# Patient Record
Sex: MALE | Race: WHITE | Employment: FULL TIME | URBAN - METROPOLITAN AREA
[De-identification: names, ages, dates, MRNs, and addresses within clinical notes are randomized per-mention and may not be internally consistent; named-entity substitution may affect disease eponyms.]

---

## 2017-05-30 ENCOUNTER — HOSPITAL ENCOUNTER (EMERGENCY)
Age: 32
Discharge: HOME | End: 2017-05-30
Attending: EMERGENCY MEDICINE
Payer: MEDICAID

## 2017-05-30 VITALS
TEMPERATURE: 98.1 F | SYSTOLIC BLOOD PRESSURE: 155 MMHG | OXYGEN SATURATION: 95 % | BODY MASS INDEX: 26.66 KG/M2 | RESPIRATION RATE: 16 BRPM | HEIGHT: 65 IN | WEIGHT: 160 LBS | HEART RATE: 95 BPM | DIASTOLIC BLOOD PRESSURE: 82 MMHG

## 2017-05-30 DIAGNOSIS — S05.01XA CORNEAL ABRASION, RIGHT, INITIAL ENCOUNTER: Primary | ICD-10-CM

## 2017-05-30 PROCEDURE — 74011000250 HC RX REV CODE- 250: Performed by: PHYSICIAN ASSISTANT

## 2017-05-30 PROCEDURE — 99283 EMERGENCY DEPT VISIT LOW MDM: CPT

## 2017-05-30 RX ORDER — TETRACAINE HYDROCHLORIDE 5 MG/ML
1 SOLUTION OPHTHALMIC
Status: COMPLETED | OUTPATIENT
Start: 2017-05-30 | End: 2017-05-30

## 2017-05-30 RX ORDER — ERYTHROMYCIN 5 MG/G
OINTMENT OPHTHALMIC
Qty: 1 TUBE | Refills: 0 | Status: SHIPPED | OUTPATIENT
Start: 2017-05-30 | End: 2017-06-06

## 2017-05-30 RX ORDER — HYDROCODONE BITARTRATE AND ACETAMINOPHEN 5; 325 MG/1; MG/1
1 TABLET ORAL
Qty: 8 TAB | Refills: 0 | Status: SHIPPED | OUTPATIENT
Start: 2017-05-30 | End: 2018-06-12

## 2017-05-30 RX ADMIN — TETRACAINE HYDROCHLORIDE 1 DROP: 5 SOLUTION OPHTHALMIC at 11:55

## 2017-05-30 RX ADMIN — FLUORESCEIN SODIUM 1 STRIP: 1 STRIP OPHTHALMIC at 11:55

## 2017-05-30 NOTE — DISCHARGE INSTRUCTIONS
Corneal Scratches: Care Instructions  Your Care Instructions    The cornea is the clear surface that covers the front of the eye. When a speck of dirt, a wood chip, an insect, or another object flies into your eye, it can cause a painful scratch on the cornea. Wearing contact lenses too long or rubbing your eyes can also scratch the cornea. Small scratches usually heal in a day or two. Deeper scratches may take longer. If you have had a foreign object removed from your eye or you have a corneal scratch, you will need to watch for infection and vision problems while your eye heals. Follow-up care is a key part of your treatment and safety. Be sure to make and go to all appointments, and call your doctor if you are having problems. Its also a good idea to know your test results and keep a list of the medicines you take. How can you care for yourself at home? · The doctor probably used a medicine during your exam to numb your eye. When it wears off in 30 to 60 minutes, your eye pain may come back. Take pain medicines exactly as directed. ¨ If the doctor gave you a prescription medicine for pain, take it as prescribed. ¨ If you are not taking a prescription pain medicine, ask your doctor if you can take an over-the-counter medicine. ¨ Do not take two or more pain medicines at the same time unless the doctor told you to. Many pain medicines have acetaminophen, which is Tylenol. Too much acetaminophen (Tylenol) can be harmful. · Do not rub your injured eye. Rubbing can make it worse. · Use the prescribed eyedrops or ointment as directed. Be sure the dropper or bottle tip is clean. To put in eyedrops or ointment:  ¨ Tilt your head back, and pull your lower eyelid down with one finger. ¨ Drop or squirt the medicine inside the lower lid. ¨ Close your eye for 30 to 60 seconds to let the drops or ointment move around. ¨ Do not touch the ointment or dropper tip to your eyelashes or any other surface.   · Do not use your contact lens in your hurt eye until your doctor says you can. Also, do not wear eye makeup until your eye has healed. · Do not drive if you have blurred vision. · Bright light may hurt. Sunglasses can help. · To prevent eye injuries in the future, wear safety glasses or goggles when you work with machines or tools, mow the lawn, or ride a bike or motorcycle. When should you call for help? Call your doctor now or seek immediate medical care if:  · You have any changes in your vision, flashes of light, or floaters (shadows or dark objects that float across your field of vision). · Pain or redness gets worse, or there is yellow, green, or bloody pus coming from the eye. · You have a fever. Watch closely for changes in your health, and be sure to contact your doctor if you have any problems. Where can you learn more? Go to http://brian-jim.info/. Enter E684 in the search box to learn more about \"Corneal Scratches: Care Instructions. \"  Current as of: May 23, 2016  Content Version: 11.2  © 9835-1088 [employer]. Care instructions adapted under license by [employer] (which disclaims liability or warranty for this information). If you have questions about a medical condition or this instruction, always ask your healthcare professional. [address] any warranty or liability for your use of this information. We hope that we have addressed all of your medical concerns. The examination and treatment you received in the Emergency Department were for an emergent problem and were not intended as complete care. It is important that you follow up with your healthcare provider(s) for ongoing care. If your symptoms worsen or do not improve as expected, and you are unable to reach your usual health care provider(s), you should return to the Emergency Department.       Today's healthcare is undergoing tremendous change, and patient satisfaction surveys are one of the many tools to assess the quality of medical care. You may receive a survey from the [employer] regarding your experience in the Emergency Department. I hope that your experience has been completely positive, particularly the medical care that I provided. As such, please participate in the survey; anything less than excellent does not meet my expectations or intentions. [facility] and [facility] participate in nationally recognized quality of care measures. If your blood pressure is greater than 120/80, as reported below, we urge that you seek medical care to address the potential of high blood pressure, commonly known as hypertension. Hypertension can be hereditary or can be caused by certain medical conditions, pain, stress, or \"white coat syndrome. \"       Please make an appointment with your health care provider(s) for follow up of your Emergency Department visit. VITALS:   Patient Vitals for the past 8 hrs:   Temp Pulse Resp BP SpO2   05/30/17 1215 - - - 155/82 95 %   05/30/17 1200 - - - (!) 139/93 97 %   05/30/17 1145 - - - 153/77 97 %   05/30/17 1126 98.1 °F (36.7 °C) 95 16 (!) 167/98 98 %          Thank you for allowing us to provide you with medical care today. We realize that you have many choices for your emergency care needs. Please choose us in the future for any continued health care needs. Karolina Lombardi, [address] [facility] [place]: [phone]            No results found for this or any previous visit (from the past 24 hour(s)). No results found.

## 2017-05-30 NOTE — ED PROVIDER NOTES
HPI Comments: 27 y.o. male with no significant past medical history presents with complaints of right eye pain. The pt states that his symptoms began abruptly last Saturday. 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 \"  He states that he has had clear drainage from his eye beginning today. Denies visual disturbance or pain with EOM. No headache nausea or vomiting. There are no other acute medical complaints at this time. PCP: None    Inga Gonzalez PA-C      Patient is a 27 y.o. male presenting with eye problem and cough. Eye Problem    Pertinent negatives include no numbness, no discharge, no photophobia, no eye redness, no nausea, no vomiting, no fever, no pain and no dizziness. Cough   Pertinent negatives include no chest pain, no eye redness, no ear pain, no rhinorrhea, no sore throat, no myalgias, no shortness of breath, no wheezing, no nausea and no vomiting. Past Medical History:   Diagnosis Date    Hypertension        Past Surgical History:   Procedure Laterality Date    NEUROLOGICAL PROCEDURE UNLISTED      injection for a pinched nerve         History reviewed. No pertinent family history. Social History     Social History    Marital status:      Spouse name: N/A    Number of children: N/A    Years of education: N/A     Occupational History    Not on file. Social History Main Topics    Smoking status: Current Every Day Smoker     Packs/day: 1.00    Smokeless tobacco: Former User    Alcohol use Yes      Comment: weekends    Drug use: No    Sexual activity: Not on file     Other Topics Concern    Not on file     Social History Narrative         ALLERGIES: Review of patient's allergies indicates no known allergies. Review of Systems   Constitutional: Negative for activity change, appetite change, diaphoresis and fever.    HENT: Negative for ear discharge, ear pain, facial swelling, rhinorrhea, sore throat, tinnitus, trouble swallowing and voice change. Eyes: Negative for photophobia, pain, discharge, redness and visual disturbance. Respiratory: Positive for cough. Negative for chest tightness, shortness of breath, wheezing and stridor. Cardiovascular: Negative for chest pain and palpitations. Gastrointestinal: Negative for abdominal pain, constipation, diarrhea, nausea and vomiting. Endocrine: Negative for polydipsia and polyuria. Genitourinary: Negative for dysuria, flank pain and hematuria. Musculoskeletal: Negative for arthralgias, back pain and myalgias. Skin: Negative for color change and rash. Neurological: Negative for dizziness, syncope, speech difficulty, light-headedness and numbness. Psychiatric/Behavioral: Negative for behavioral problems. Vitals:    05/30/17 1126 05/30/17 1145 05/30/17 1200 05/30/17 1215   BP: (!) 167/98 153/77 (!) 139/93 155/82   Pulse: 95      Resp: 16      Temp: 98.1 °F (36.7 °C)      SpO2: 98% 97% 97% 95%   Weight: 72.6 kg (160 lb)      Height: 5' 5\" (1.651 m)               Physical Exam   Constitutional: He is oriented to person, place, and time. He appears well-developed and well-nourished. No distress. HENT:   Head: Normocephalic and atraumatic. Eyes: EOM are normal. Pupils are equal, round, and reactive to light. Right conjunctiva is injected. Right conjunctiva has no hemorrhage. Left conjunctiva is not injected. Left conjunctiva has no hemorrhage. Right eye exhibits normal extraocular motion and no nystagmus. Left eye exhibits normal extraocular motion and no nystagmus. Slit lamp exam:       The right eye shows corneal abrasion. The right eye shows no foreign body. The left eye shows no corneal abrasion and no foreign body. IOP right eye; 21 mmg Hg & 21 mm Hg    Neck: Normal range of motion. Neck supple. Cardiovascular: Normal rate, regular rhythm and normal heart sounds.     Pulmonary/Chest: Effort normal and breath sounds normal. No respiratory distress. He has no wheezes. Musculoskeletal: Normal range of motion. Neurological: He is alert and oriented to person, place, and time. Skin: Skin is warm. He is not diaphoretic. MDM  Number of Diagnoses or Management Options  Corneal abrasion, right, initial encounter:   Diagnosis management comments: Pt presents with complaints of right eye pain. Corneal abrasion present. No pain with EOM. Presentation does not suggest glaucoma, amaurosis fugax or any other acute life threatening emergency. Covered with erythromycin eye drop and referred to opthalmology. Reviewed treatment plan with attending and they agree.   Zoie Montalvo PA-C     ED Course       Procedures

## 2017-05-30 NOTE — ED TRIAGE NOTES
Pt ambulatory to treatment area with c/o \"Sunday my right eye starting bothering me and now it's red and draining clear liquid. \"  Pt states \"this has happened before and they said I had scratched my eye. \"  Pt also reports \"cold symptoms and chest congestion and headache that started Saturday. \"  Pt denies fevers.

## 2018-05-08 ENCOUNTER — HOSPITAL ENCOUNTER (OUTPATIENT)
Dept: CT IMAGING | Age: 33
Discharge: HOME | End: 2018-05-08
Attending: ORTHOPAEDIC SURGERY
Payer: COMMERCIAL

## 2018-05-08 DIAGNOSIS — S52.502A CLOSED FRACTURE OF LEFT DISTAL RADIUS AND ULNA, INITIAL ENCOUNTER: ICD-10-CM

## 2018-05-08 DIAGNOSIS — S52.602A CLOSED FRACTURE OF LEFT DISTAL RADIUS AND ULNA, INITIAL ENCOUNTER: ICD-10-CM

## 2018-05-08 PROCEDURE — 73200 CT UPPER EXTREMITY W/O DYE: CPT

## 2018-06-12 ENCOUNTER — HOSPITAL ENCOUNTER (EMERGENCY)
Age: 33
Discharge: HOME | End: 2018-06-12
Attending: EMERGENCY MEDICINE
Payer: COMMERCIAL

## 2018-06-12 VITALS
TEMPERATURE: 98.3 F | DIASTOLIC BLOOD PRESSURE: 89 MMHG | WEIGHT: 158.73 LBS | HEIGHT: 66 IN | RESPIRATION RATE: 14 BRPM | HEART RATE: 95 BPM | SYSTOLIC BLOOD PRESSURE: 146 MMHG | OXYGEN SATURATION: 100 % | BODY MASS INDEX: 25.51 KG/M2

## 2018-06-12 DIAGNOSIS — G50.1 ATYPICAL FACE PAIN: Primary | ICD-10-CM

## 2018-06-12 DIAGNOSIS — K08.89 DENTALGIA: ICD-10-CM

## 2018-06-12 DIAGNOSIS — F17.200 TOBACCO DEPENDENCE: ICD-10-CM

## 2018-06-12 PROCEDURE — 99282 EMERGENCY DEPT VISIT SF MDM: CPT

## 2018-06-12 RX ORDER — HYDROCODONE BITARTRATE AND ACETAMINOPHEN 5; 325 MG/1; MG/1
1 TABLET ORAL
Qty: 12 TAB | Refills: 0 | Status: SHIPPED | OUTPATIENT
Start: 2018-06-12

## 2018-06-12 RX ORDER — CLINDAMYCIN HYDROCHLORIDE 300 MG/1
300 CAPSULE ORAL 3 TIMES DAILY
Qty: 30 CAP | Refills: 0 | Status: SHIPPED | OUTPATIENT
Start: 2018-06-12 | End: 2018-06-22

## 2018-06-12 NOTE — ED PROVIDER NOTES
EMERGENCY DEPARTMENT HISTORY AND PHYSICAL EXAM      Date: 6/12/2018  Patient Name: Melinda Hughes. History of Presenting Illness     Chief Complaint   Patient presents with    Dental Pain     Ambulatory w/ c/o R lower dental pain x3 days w/ associated headache       History Provided By: Patient    HPI: Melinda Hughes., 26 y.o. male with PMHx significant for HTN, presents ambulatory to the ED with cc of R lower dental pain x 3 days. He was eating and \"then felt his tooth crumple. \" He has been using Orajel with mild, temporary relief. 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 Pt endorses daily tobacco use. He has no known medication allergies. He denies any fevers, chills, n/v/d, CP, SOB, or drainage from site of dental pain. Chief Complaint: dental pain   Duration: 3 Days  Timing:  Constant  Location: mouth  Quality: Aching  Severity: Mild  Modifying Factors: Orajel provides temporary relief. Associated Symptoms: denies any other associated signs or symptoms    There are no other complaints, changes, or physical findings at this time. PCP: None        Past History     Past Medical History:  Past Medical History:   Diagnosis Date    Hypertension        Past Surgical History:  Past Surgical History:   Procedure Laterality Date    NEUROLOGICAL PROCEDURE UNLISTED      injection for a pinched nerve       Family History:  History reviewed. No pertinent family history. Social History:  Social History   Substance Use Topics    Smoking status: Current Every Day Smoker     Packs/day: 1.00    Smokeless tobacco: Former User    Alcohol use Yes      Comment: social       Allergies:  No Known Allergies      Review of Systems   Review of Systems   Constitutional: Negative for chills and fever. HENT: Positive for dental problem (R lower).  Negative for congestion, facial swelling, rhinorrhea, sore throat and trouble swallowing. Eyes: Negative for pain and redness. Respiratory: Negative for cough and shortness of breath. Cardiovascular: Negative for chest pain and palpitations. Gastrointestinal: Negative for abdominal pain, diarrhea, nausea and vomiting. Genitourinary: Negative for dysuria and hematuria. Musculoskeletal: Negative for neck pain and neck stiffness. Skin: Negative for rash and wound. Allergic/Immunologic: Negative for food allergies and immunocompromised state. Neurological: Negative for dizziness and headaches. Psychiatric/Behavioral: Negative for agitation and confusion. Physical Exam   Physical Exam   Constitutional: He is oriented to person, place, and time. He appears well-developed and well-nourished. No distress. WDWN male, alert, in NAD   HENT:   Head: Normocephalic and atraumatic. Nose: Nose normal.   Mouth/Throat: No oropharyngeal exudate. Pt with poor overall dental health, multiple dental caries, decayed/missing teeth. Tender to  R lower molar which was notably decayed. No gingival erythema, edema, exudate, or bleeding noted. Eyes: Conjunctivae and EOM are normal. Right eye exhibits no discharge. Left eye exhibits no discharge. No scleral icterus. Neck: Normal range of motion. Neck supple. No JVD present. No tracheal deviation present. No thyromegaly present. Cardiovascular: Normal rate, regular rhythm and normal heart sounds. Pulmonary/Chest: Effort normal and breath sounds normal. No respiratory distress. He has no wheezes. Musculoskeletal: Normal range of motion. He exhibits no edema. Lymphadenopathy:     He has no cervical adenopathy. Neurological: He is alert and oriented to person, place, and time. He exhibits normal muscle tone. Coordination normal.   Skin: Skin is warm and dry. No rash noted. He is not diaphoretic. No erythema. Psychiatric: He has a normal mood and affect.  His behavior is normal. Judgment normal. Nursing note and vitals reviewed. Diagnostic Study Results     Labs -   No results found for this or any previous visit (from the past 12 hour(s)). Radiologic Studies -   No orders to display     CT Results  (Last 48 hours)    None        CXR Results  (Last 48 hours)    None            Medical Decision Making   I am the first provider for this patient. I reviewed the vital signs, available nursing notes, past medical history, past surgical history, family history and social history. Vital Signs-Reviewed the patient's vital signs. Patient Vitals for the past 12 hrs:   Temp Pulse Resp BP SpO2   06/12/18 1639 98.3 °F (36.8 °C) 95 14 146/89 100 %       Pulse Oximetry Analysis - 100% on RA    Cardiac Monitor:   Rate: 95 bpm    Records Reviewed: Old Medical Records    Provider Notes (Medical Decision Making):   DDx; dental fracture, dental abscess, dental pain    ED Course:   Initial assessment performed. The patients presenting problems have been discussed, and they are in agreement with the care plan formulated and outlined with them. I have encouraged them to ask questions as they arise throughout their visit. TOBACCO COUNSELING:  Upon evaluation, pt expressed that they are a current tobacco user. Pt has been counseled on the dangers of smoking and was encouraged to quit as soon as possible in order to decrease further risks to their health. Pt has conveyed their understanding of the risks involved should they continue to use tobacco products. Critical Care Time:   None     Disposition:  Discharge Note:  5:23 PM  The pt is ready for discharge. The pt's signs, symptoms, diagnosis, and discharge instructions have been discussed and pt has conveyed their understanding. The pt is to follow up as recommended or return to ER should their symptoms worsen. Plan has been discussed and pt is in agreement. PLAN:  1.    Discharge Medication List as of 6/12/2018  5:20 PM      START taking these medications    Details   clindamycin (CLEOCIN) 300 mg capsule Take 1 Cap by mouth three (3) times daily for 10 days. , Print, Disp-30 Cap, R-0      HYDROcodone-acetaminophen (NORCO) 5-325 mg per tablet Take 1 Tab by mouth every six (6) hours as needed for Pain for up to 12 doses. Max Daily Amount: 4 Tabs., Print, Disp-12 Tab, R-0           2. Follow-up Information     Follow up With Details Comments Contact Info    [facility]   [address]  [phone]    [facility]   [address]  [phone]    [facility] EMERGENCY DEPT  If symptoms worsen [address] [facility] [address]  [phone]        Return to ED if worse     Diagnosis     Clinical Impression:   1. Atypical face pain    2. Dentalgia    3. Tobacco dependence        Attestations:    Attestations: This note is prepared by Harish Marte, acting as Scribe for [employer]: The scribe's documentation has been prepared under my direction and personally reviewed by me in its entirety. I confirm that the note above accurately reflects all work, treatment, procedures, and medical decision making performed by me.

## 2018-06-12 NOTE — LETTER
[address] 
[facility] EMERGENCY DEPT 
[address] [id number] 
[phone] Work/School Note Date: 6/12/2018 To Whom It May concern: 
 
Eduardo KarenannaAlba was seen and treated today in the emergency room. He may return to work in 1 to 2 days, as symptoms improve. Sincerely, Kristy Presley

## 2018-06-12 NOTE — DISCHARGE INSTRUCTIONS
Tooth and Gum Pain: Care Instructions  Your Care Instructions    The most common causes of dental pain are tooth decay and gum disease. Pain can also be caused by an infection of the tooth (abscess) or the gums. Or you may have pain from a broken or cracked tooth. Other causes of pain include infection and damage to a tooth from nervous grinding of your teeth. A wisdom tooth can be painful when it is coming in but cannot break through the gum. It can also be painful when the tooth is only partway in and extra gum tissue has formed around it. The tissue can get inflamed (pericoronitis), and sometimes it gets infected. Prompt dental care can help find the cause of your toothache and keep the tooth from dying or gum disease from getting worse. Self-care at home may reduce your pain and discomfort. Follow-up care is a key part of your treatment and safety. Be sure to make and go to all appointments, and call your dentist or doctor if you are having problems. It's also a good idea to know your test results and keep a list of the medicines you take. How can you care for yourself at home? · To reduce pain and facial swelling, put an ice or cold pack on the outside of your cheek for 10 to 20 minutes at a time. Put a thin cloth between the ice and your skin. Do not use heat. · If your doctor prescribed antibiotics, take them as directed. Do not stop taking them just because you feel better. You need to take the full course of antibiotics. · Ask your doctor if you can take an over-the-counter pain medicine, such as acetaminophen (Tylenol), ibuprofen (Advil, Motrin), or naproxen (Aleve). Be safe with medicines. Read and follow all instructions on the label. · Avoid very hot, cold, or sweet foods and drinks if they increase your pain. · Rinse your mouth with warm salt water every 2 hours to help relieve pain and swelling. Mix 1 teaspoon of salt in 8 ounces of water.   · Talk to your dentist about using special toothpaste for sensitive teeth. To reduce pain on contact with heat or cold or when brushing, brush with this toothpaste regularly or rub a small amount of the paste on the sensitive area with a clean finger 2 or 3 times a day. Floss gently between your teeth. · Do not smoke or use spit tobacco. Tobacco use can make gum problems worse, decreases your ability to fight infection in your gums, and delays healing. If you need help quitting, talk to your doctor about stop-smoking programs and medicines. These can increase your chances of quitting for good. When should you call for help? Call 911 anytime you think you may need emergency care. For example, call if:  ? · You have trouble breathing. ?Call your dentist or doctor now or seek immediate medical care if:  ? · You have signs of infection, such as:  ¨ Increased pain, swelling, warmth, or redness. ¨ Red streaks leading from the area. ¨ Pus draining from the area. ¨ A fever. ? Watch closely for changes in your health, and be sure to contact your doctor if:  ? · You do not get better as expected. Where can you learn more? Go to http://brian-jim.info/. Enter 0363 [id number] in the search box to learn more about \"Tooth and Gum Pain: Care Instructions. \"  Current as of: May 12, 2017  Content Version: 11.4  © 4441-5671 [employer]. Care instructions adapted under license by [employer] (which disclaims liability or warranty for this information). If you have questions about a medical condition or this instruction, always ask your healthcare professional. [place] [address] any warranty or liability for your use of this information. Learning About Benefits From Quitting Smoking  How does quitting smoking make you healthier? If you're thinking about quitting smoking, you may have a few reasons to be smoke-free. Your health may be one of them.   · When you quit smoking, you lower your risks for cancer, lung disease, heart attack, stroke, blood vessel disease, and blindness from macular degeneration. · When you're smoke-free, you get sick less often, and you heal faster. You are less likely to get colds, flu, bronchitis, and pneumonia. · As a nonsmoker, you may find that your mood is better and you are less stressed. When and how will you feel healthier? Quitting has real health benefits that start from day 1 of being smoke-free. And the longer you stay smoke-free, the healthier you get and the better you feel. The first hours  · After just 20 minutes, your blood pressure and heart rate go down. That means there's less stress on your heart and blood vessels. · Within 12 hours, the level of carbon monoxide in your blood drops back to normal. That makes room for more oxygen. With more oxygen in your body, you may notice that you have more energy than when you smoked. After 2 weeks  · Your lungs start to work better. · Your risk of heart attack starts to drop. After 1 month  · When your lungs are clear, you cough less and breathe deeper, so it's easier to be active. · Your sense of taste and smell return. That means you can enjoy food more than you have since you started smoking. Over the years  · After 1 year, your risk of heart disease is half what it would be if you kept smoking. · After 5 years, your risk of stroke starts to shrink. Within a few years after that, it's about the same as if you'd never smoked. · After 10 years, your risk of dying from lung cancer is cut by about half. And your risk for many other types of cancer is lower too. How would quitting help others in your life? When you quit smoking, you improve the health of everyone who now breathes in your smoke. · Their heart, lung, and cancer risks drop, much like yours. · They are sick less. For babies and small children, living smoke-free means they're less likely to have ear infections, pneumonia, and bronchitis.   · If you're a woman who is or will be pregnant someday, quitting smoking means a healthier . · Children who are close to you are less likely to become adult smokers. Where can you learn more? Go to http://brian-jim.info/. Enter [id number] [id number] in the search box to learn more about \"Learning About Benefits From Quitting Smoking. \"  Current as of: 2017  Content Version: 11.4  © 2795-4709 [employer]. Care instructions adapted under license by [employer] (which disclaims liability or warranty for this information). If you have questions about a medical condition or this instruction, always ask your healthcare professional. [place] [address] any warranty or liability for your use of this information.

## 2022-11-22 ENCOUNTER — HOSPITAL ENCOUNTER (INPATIENT)
Age: 37
LOS: 9 days | Discharge: HOME | End: 2022-12-02
Attending: STUDENT IN AN ORGANIZED HEALTH CARE EDUCATION/TRAINING PROGRAM | Admitting: STUDENT IN AN ORGANIZED HEALTH CARE EDUCATION/TRAINING PROGRAM
Payer: MEDICAID

## 2022-11-22 ENCOUNTER — APPOINTMENT (OUTPATIENT)
Dept: MRI IMAGING | Age: 37
End: 2022-11-22
Attending: STUDENT IN AN ORGANIZED HEALTH CARE EDUCATION/TRAINING PROGRAM
Payer: MEDICAID

## 2022-11-22 DIAGNOSIS — R29.898 LEG WEAKNESS, BILATERAL: ICD-10-CM

## 2022-11-22 DIAGNOSIS — M21.372 FOOT DROP, BILATERAL: ICD-10-CM

## 2022-11-22 DIAGNOSIS — M62.82 NON-TRAUMATIC RHABDOMYOLYSIS: Primary | ICD-10-CM

## 2022-11-22 DIAGNOSIS — F19.10 IV DRUG ABUSE (HCC): ICD-10-CM

## 2022-11-22 DIAGNOSIS — M21.371 FOOT DROP, BILATERAL: ICD-10-CM

## 2022-11-22 PROCEDURE — 72157 MRI CHEST SPINE W/O & W/DYE: CPT

## 2022-11-22 PROCEDURE — A9576 INJ PROHANCE MULTIPACK: HCPCS | Performed by: STUDENT IN AN ORGANIZED HEALTH CARE EDUCATION/TRAINING PROGRAM

## 2022-11-22 PROCEDURE — 90471 IMMUNIZATION ADMIN: CPT

## 2022-11-22 PROCEDURE — 72158 MRI LUMBAR SPINE W/O & W/DYE: CPT

## 2022-11-22 PROCEDURE — 99285 EMERGENCY DEPT VISIT HI MDM: CPT

## 2022-11-22 PROCEDURE — 74011250636 HC RX REV CODE- 250/636: Performed by: STUDENT IN AN ORGANIZED HEALTH CARE EDUCATION/TRAINING PROGRAM

## 2022-11-22 PROCEDURE — 72156 MRI NECK SPINE W/O & W/DYE: CPT

## 2022-11-22 RX ADMIN — GADOTERIDOL 14 ML: 279.3 INJECTION, SOLUTION INTRAVENOUS at 22:35

## 2022-11-23 ENCOUNTER — APPOINTMENT (OUTPATIENT)
Dept: GENERAL RADIOLOGY | Age: 37
End: 2022-11-23
Attending: EMERGENCY MEDICINE
Payer: MEDICAID

## 2022-11-23 ENCOUNTER — APPOINTMENT (OUTPATIENT)
Dept: CT IMAGING | Age: 37
End: 2022-11-23
Attending: STUDENT IN AN ORGANIZED HEALTH CARE EDUCATION/TRAINING PROGRAM
Payer: MEDICAID

## 2022-11-23 PROBLEM — M62.82 RHABDOMYOLYSIS: Status: ACTIVE | Noted: 2022-11-23

## 2022-11-23 LAB
ALBUMIN SERPL-MCNC: 3.8 G/DL (ref 3.5–5)
ALBUMIN/GLOB SERPL: 1 {RATIO} (ref 1.1–2.2)
ALP SERPL-CCNC: 87 U/L (ref 45–117)
ALT SERPL-CCNC: 148 U/L (ref 12–78)
AMPHET UR QL SCN: NEGATIVE
ANION GAP SERPL CALC-SCNC: 9 MMOL/L (ref 5–15)
APPEARANCE UR: CLEAR
AST SERPL-CCNC: 375 U/L (ref 15–37)
ATRIAL RATE: 87 BPM
B PERT DNA SPEC QL NAA+PROBE: NOT DETECTED
BACTERIA URNS QL MICRO: ABNORMAL /HPF
BARBITURATES UR QL SCN: NEGATIVE
BASOPHILS # BLD: 0 K/UL (ref 0–0.1)
BASOPHILS NFR BLD: 0 % (ref 0–1)
BENZODIAZ UR QL: NEGATIVE
BILIRUB SERPL-MCNC: 0.6 MG/DL (ref 0.2–1)
BILIRUB UR QL: NEGATIVE
BORDETELLA PARAPERTUSSIS PCR, BORPAR: NOT DETECTED
BUN SERPL-MCNC: 30 MG/DL (ref 6–20)
BUN/CREAT SERPL: 21 (ref 12–20)
C PNEUM DNA SPEC QL NAA+PROBE: NOT DETECTED
CALCIUM SERPL-MCNC: 8.7 MG/DL (ref 8.5–10.1)
CALCULATED P AXIS, ECG09: -3 DEGREES
CALCULATED R AXIS, ECG10: 44 DEGREES
CALCULATED T AXIS, ECG11: 23 DEGREES
CANNABINOIDS UR QL SCN: NEGATIVE
CHLORIDE SERPL-SCNC: 100 MMOL/L (ref 97–108)
CK SERPL-CCNC: ABNORMAL U/L (ref 39–308)
CO2 SERPL-SCNC: 25 MMOL/L (ref 21–32)
COCAINE UR QL SCN: POSITIVE
COLOR UR: ABNORMAL
COVID-19 RAPID TEST, COVR: NOT DETECTED
CREAT SERPL-MCNC: 1.4 MG/DL (ref 0.7–1.3)
DIAGNOSIS, 93000: NORMAL
DIFFERENTIAL METHOD BLD: ABNORMAL
DRUG SCRN COMMENT,DRGCM: ABNORMAL
EOSINOPHIL # BLD: 0 K/UL (ref 0–0.4)
EOSINOPHIL NFR BLD: 0 % (ref 0–7)
EPITH CASTS URNS QL MICRO: ABNORMAL /LPF
ERYTHROCYTE [DISTWIDTH] IN BLOOD BY AUTOMATED COUNT: 14.1 % (ref 11.5–14.5)
ERYTHROCYTE [SEDIMENTATION RATE] IN BLOOD: 18 MM/HR (ref 0–15)
FLUAV AG NPH QL IA: NEGATIVE
FLUAV SUBTYP SPEC NAA+PROBE: NOT DETECTED
FLUBV AG NOSE QL IA: NEGATIVE
FLUBV RNA SPEC QL NAA+PROBE: NOT DETECTED
GLOBULIN SER CALC-MCNC: 4 G/DL (ref 2–4)
GLUCOSE SERPL-MCNC: 87 MG/DL (ref 65–100)
GLUCOSE UR STRIP.AUTO-MCNC: NEGATIVE MG/DL
GRAN CASTS URNS QL MICRO: ABNORMAL /LPF
HADV DNA SPEC QL NAA+PROBE: NOT DETECTED
HCOV 229E RNA SPEC QL NAA+PROBE: NOT DETECTED
HCOV HKU1 RNA SPEC QL NAA+PROBE: NOT DETECTED
HCOV NL63 RNA SPEC QL NAA+PROBE: NOT DETECTED
HCOV OC43 RNA SPEC QL NAA+PROBE: NOT DETECTED
HCT VFR BLD AUTO: 43.6 % (ref 36.6–50.3)
HGB BLD-MCNC: 15 G/DL (ref 12.1–17)
HGB UR QL STRIP: ABNORMAL
HMPV RNA SPEC QL NAA+PROBE: NOT DETECTED
HPIV1 RNA SPEC QL NAA+PROBE: NOT DETECTED
HPIV2 RNA SPEC QL NAA+PROBE: NOT DETECTED
HPIV3 RNA SPEC QL NAA+PROBE: NOT DETECTED
HPIV4 RNA SPEC QL NAA+PROBE: NOT DETECTED
IMM GRANULOCYTES # BLD AUTO: 0 K/UL (ref 0–0.04)
IMM GRANULOCYTES NFR BLD AUTO: 0 % (ref 0–0.5)
KETONES UR QL STRIP.AUTO: 15 MG/DL
LEUKOCYTE ESTERASE UR QL STRIP.AUTO: NEGATIVE
LYMPHOCYTES # BLD: 2.5 K/UL (ref 0.8–3.5)
LYMPHOCYTES NFR BLD: 19 % (ref 12–49)
M PNEUMO DNA SPEC QL NAA+PROBE: NOT DETECTED
MCH RBC QN AUTO: 31.1 PG (ref 26–34)
MCHC RBC AUTO-ENTMCNC: 34.4 G/DL (ref 30–36.5)
MCV RBC AUTO: 90.5 FL (ref 80–99)
METHADONE UR QL: NEGATIVE
MONOCYTES # BLD: 1.2 K/UL (ref 0–1)
MONOCYTES NFR BLD: 9 % (ref 5–13)
NEUTS SEG # BLD: 9.9 K/UL (ref 1.8–8)
NEUTS SEG NFR BLD: 72 % (ref 32–75)
NITRITE UR QL STRIP.AUTO: NEGATIVE
NRBC # BLD: 0 K/UL (ref 0–0.01)
NRBC BLD-RTO: 0 PER 100 WBC
OPIATES UR QL: POSITIVE
P-R INTERVAL, ECG05: 116 MS
PCP UR QL: NEGATIVE
PH UR STRIP: 5 [PH] (ref 5–8)
PLATELET # BLD AUTO: 341 K/UL (ref 150–400)
PMV BLD AUTO: 10.1 FL (ref 8.9–12.9)
POTASSIUM SERPL-SCNC: 4.4 MMOL/L (ref 3.5–5.1)
PROT SERPL-MCNC: 7.8 G/DL (ref 6.4–8.2)
PROT UR STRIP-MCNC: 30 MG/DL
Q-T INTERVAL, ECG07: 406 MS
QRS DURATION, ECG06: 96 MS
QTC CALCULATION (BEZET), ECG08: 488 MS
RBC # BLD AUTO: 4.82 M/UL (ref 4.1–5.7)
RBC #/AREA URNS HPF: ABNORMAL /HPF (ref 0–5)
RSV RNA SPEC QL NAA+PROBE: NOT DETECTED
RV+EV RNA SPEC QL NAA+PROBE: NOT DETECTED
SARS-COV-2 PCR, COVPCR: NOT DETECTED
SODIUM SERPL-SCNC: 134 MMOL/L (ref 136–145)
SOURCE, COVRS: NORMAL
SP GR UR REFRACTOMETRY: 1.02
TROPONIN-HIGH SENSITIVITY: 528 NG/L (ref 0–76)
UA: UC IF INDICATED,UAUC: ABNORMAL
UROBILINOGEN UR QL STRIP.AUTO: 0.2 EU/DL (ref 0.2–1)
VENTRICULAR RATE, ECG03: 87 BPM
WBC # BLD AUTO: 13.6 K/UL (ref 4.1–11.1)
WBC URNS QL MICRO: ABNORMAL /HPF (ref 0–4)

## 2022-11-23 PROCEDURE — 93005 ELECTROCARDIOGRAM TRACING: CPT

## 2022-11-23 PROCEDURE — 74011250637 HC RX REV CODE- 250/637: Performed by: INTERNAL MEDICINE

## 2022-11-23 PROCEDURE — 74011250636 HC RX REV CODE- 250/636: Performed by: EMERGENCY MEDICINE

## 2022-11-23 PROCEDURE — 74011250636 HC RX REV CODE- 250/636: Performed by: INTERNAL MEDICINE

## 2022-11-23 PROCEDURE — 71045 X-RAY EXAM CHEST 1 VIEW: CPT

## 2022-11-23 PROCEDURE — 65270000046 HC RM TELEMETRY

## 2022-11-23 PROCEDURE — 85025 COMPLETE CBC W/AUTO DIFF WBC: CPT

## 2022-11-23 PROCEDURE — 74011250637 HC RX REV CODE- 250/637: Performed by: HOSPITALIST

## 2022-11-23 PROCEDURE — 74011000250 HC RX REV CODE- 250: Performed by: INTERNAL MEDICINE

## 2022-11-23 PROCEDURE — 36415 COLL VENOUS BLD VENIPUNCTURE: CPT

## 2022-11-23 PROCEDURE — 75635 CT ANGIO ABDOMINAL ARTERIES: CPT

## 2022-11-23 PROCEDURE — 74011250636 HC RX REV CODE- 250/636: Performed by: STUDENT IN AN ORGANIZED HEALTH CARE EDUCATION/TRAINING PROGRAM

## 2022-11-23 PROCEDURE — 81001 URINALYSIS AUTO W/SCOPE: CPT

## 2022-11-23 PROCEDURE — 80053 COMPREHEN METABOLIC PANEL: CPT

## 2022-11-23 PROCEDURE — 84484 ASSAY OF TROPONIN QUANT: CPT

## 2022-11-23 PROCEDURE — 80307 DRUG TEST PRSMV CHEM ANLYZR: CPT

## 2022-11-23 PROCEDURE — 0202U NFCT DS 22 TRGT SARS-COV-2: CPT

## 2022-11-23 PROCEDURE — 90715 TDAP VACCINE 7 YRS/> IM: CPT | Performed by: STUDENT IN AN ORGANIZED HEALTH CARE EDUCATION/TRAINING PROGRAM

## 2022-11-23 PROCEDURE — 87635 SARS-COV-2 COVID-19 AMP PRB: CPT

## 2022-11-23 PROCEDURE — 74011000636 HC RX REV CODE- 636: Performed by: EMERGENCY MEDICINE

## 2022-11-23 PROCEDURE — 87040 BLOOD CULTURE FOR BACTERIA: CPT

## 2022-11-23 PROCEDURE — 82550 ASSAY OF CK (CPK): CPT

## 2022-11-23 PROCEDURE — 85652 RBC SED RATE AUTOMATED: CPT

## 2022-11-23 PROCEDURE — 87804 INFLUENZA ASSAY W/OPTIC: CPT

## 2022-11-23 PROCEDURE — 99221 1ST HOSP IP/OBS SF/LOW 40: CPT | Performed by: INTERNAL MEDICINE

## 2022-11-23 RX ORDER — SODIUM CHLORIDE 9 MG/ML
125 INJECTION, SOLUTION INTRAVENOUS CONTINUOUS
Status: DISPENSED | OUTPATIENT
Start: 2022-11-23 | End: 2022-11-23

## 2022-11-23 RX ORDER — OXYCODONE HYDROCHLORIDE 5 MG/1
5 TABLET ORAL
Status: COMPLETED | OUTPATIENT
Start: 2022-11-23 | End: 2022-11-23

## 2022-11-23 RX ORDER — PROMETHAZINE HYDROCHLORIDE 25 MG/1
12.5 TABLET ORAL
Status: DISCONTINUED | OUTPATIENT
Start: 2022-11-23 | End: 2022-12-02 | Stop reason: HOSPADM

## 2022-11-23 RX ORDER — HYDRALAZINE HYDROCHLORIDE 20 MG/ML
20 INJECTION INTRAMUSCULAR; INTRAVENOUS
Status: DISCONTINUED | OUTPATIENT
Start: 2022-11-23 | End: 2022-12-02 | Stop reason: HOSPADM

## 2022-11-23 RX ORDER — AMLODIPINE BESYLATE 5 MG/1
5 TABLET ORAL DAILY
Status: DISCONTINUED | OUTPATIENT
Start: 2022-11-23 | End: 2022-11-25

## 2022-11-23 RX ORDER — BISACODYL 5 MG
5 TABLET, DELAYED RELEASE (ENTERIC COATED) ORAL DAILY PRN
Status: DISCONTINUED | OUTPATIENT
Start: 2022-11-23 | End: 2022-12-02 | Stop reason: HOSPADM

## 2022-11-23 RX ORDER — ACETAMINOPHEN 650 MG/1
650 SUPPOSITORY RECTAL
Status: DISCONTINUED | OUTPATIENT
Start: 2022-11-23 | End: 2022-12-02 | Stop reason: HOSPADM

## 2022-11-23 RX ORDER — POLYETHYLENE GLYCOL 3350 17 G/17G
17 POWDER, FOR SOLUTION ORAL DAILY
Status: DISCONTINUED | OUTPATIENT
Start: 2022-11-23 | End: 2022-12-02 | Stop reason: HOSPADM

## 2022-11-23 RX ORDER — ENOXAPARIN SODIUM 100 MG/ML
40 INJECTION SUBCUTANEOUS DAILY
Status: DISCONTINUED | OUTPATIENT
Start: 2022-11-24 | End: 2022-12-02 | Stop reason: HOSPADM

## 2022-11-23 RX ORDER — SODIUM CHLORIDE 9 MG/ML
100 INJECTION, SOLUTION INTRAVENOUS CONTINUOUS
Status: DISPENSED | OUTPATIENT
Start: 2022-11-23 | End: 2022-11-23

## 2022-11-23 RX ORDER — SODIUM CHLORIDE 9 MG/ML
100 INJECTION, SOLUTION INTRAVENOUS CONTINUOUS
Status: DISCONTINUED | OUTPATIENT
Start: 2022-11-23 | End: 2022-11-28

## 2022-11-23 RX ORDER — SODIUM CHLORIDE 0.9 % (FLUSH) 0.9 %
5-40 SYRINGE (ML) INJECTION EVERY 8 HOURS
Status: DISCONTINUED | OUTPATIENT
Start: 2022-11-23 | End: 2022-12-02 | Stop reason: HOSPADM

## 2022-11-23 RX ORDER — ASPIRIN 325 MG/1
200 TABLET, FILM COATED ORAL DAILY
Status: DISCONTINUED | OUTPATIENT
Start: 2022-11-24 | End: 2022-12-02 | Stop reason: HOSPADM

## 2022-11-23 RX ORDER — ACETAMINOPHEN 325 MG/1
650 TABLET ORAL
Status: DISCONTINUED | OUTPATIENT
Start: 2022-11-23 | End: 2022-12-02 | Stop reason: HOSPADM

## 2022-11-23 RX ORDER — ONDANSETRON 2 MG/ML
4 INJECTION INTRAMUSCULAR; INTRAVENOUS
Status: DISCONTINUED | OUTPATIENT
Start: 2022-11-23 | End: 2022-12-02 | Stop reason: HOSPADM

## 2022-11-23 RX ORDER — SODIUM CHLORIDE 0.9 % (FLUSH) 0.9 %
5-40 SYRINGE (ML) INJECTION AS NEEDED
Status: DISCONTINUED | OUTPATIENT
Start: 2022-11-23 | End: 2022-12-02 | Stop reason: HOSPADM

## 2022-11-23 RX ORDER — IBUPROFEN 200 MG
1 TABLET ORAL DAILY
Status: DISCONTINUED | OUTPATIENT
Start: 2022-11-23 | End: 2022-12-02 | Stop reason: HOSPADM

## 2022-11-23 RX ORDER — OXYCODONE HYDROCHLORIDE 5 MG/1
5 TABLET ORAL
Status: DISCONTINUED | OUTPATIENT
Start: 2022-11-23 | End: 2022-12-02 | Stop reason: HOSPADM

## 2022-11-23 RX ADMIN — SODIUM CHLORIDE 100 ML/HR: 9 INJECTION, SOLUTION INTRAVENOUS at 05:56

## 2022-11-23 RX ADMIN — SODIUM CHLORIDE 1000 ML: 9 INJECTION, SOLUTION INTRAVENOUS at 00:28

## 2022-11-23 RX ADMIN — OXYCODONE 5 MG: 5 TABLET ORAL at 21:08

## 2022-11-23 RX ADMIN — TETANUS TOXOID, REDUCED DIPHTHERIA TOXOID AND ACELLULAR PERTUSSIS VACCINE, ADSORBED 0.5 ML: 5; 2.5; 8; 8; 2.5 SUSPENSION INTRAMUSCULAR at 00:30

## 2022-11-23 RX ADMIN — SODIUM CHLORIDE, PRESERVATIVE FREE 10 ML: 5 INJECTION INTRAVENOUS at 21:09

## 2022-11-23 RX ADMIN — SODIUM CHLORIDE 1000 ML: 9 INJECTION, SOLUTION INTRAVENOUS at 03:15

## 2022-11-23 RX ADMIN — AMLODIPINE BESYLATE 5 MG: 5 TABLET ORAL at 17:10

## 2022-11-23 RX ADMIN — OXYCODONE 5 MG: 5 TABLET ORAL at 11:06

## 2022-11-23 RX ADMIN — SODIUM CHLORIDE, PRESERVATIVE FREE 10 ML: 5 INJECTION INTRAVENOUS at 17:10

## 2022-11-23 RX ADMIN — OXYCODONE 5 MG: 5 TABLET ORAL at 23:06

## 2022-11-23 RX ADMIN — SODIUM CHLORIDE 125 ML/HR: 9 INJECTION, SOLUTION INTRAVENOUS at 17:10

## 2022-11-23 RX ADMIN — IOPAMIDOL 100 ML: 755 INJECTION, SOLUTION INTRAVENOUS at 02:27

## 2022-11-23 RX ADMIN — OXYCODONE 5 MG: 5 TABLET ORAL at 17:10

## 2022-11-23 NOTE — PROGRESS NOTES
adEnd of Shift Note    Bedside shift change report given to Alee Fried RN (oncoming nurse) by Riana Gottlieb RN (offgoing nurse). Report included the following information SBAR, Kardex, and MAR    Shift worked:  7am-7pm     Shift summary and any significant changes:     Pt tolerated care fairly well. Medications were given and education was provided. Hourly rounding completed. Pt complaints of pain were treated with PRN pain medications (See MAR). Pt diet advanced from NPO to cardiac diet; pt tolerated it well. Pt output charted. PCR completed.           Riana Gottlieb RN

## 2022-11-23 NOTE — ED PROVIDER NOTES
EMERGENCY DEPARTMENT HISTORY AND PHYSICAL EXAM      Date: 11/22/2022  Patient Name: Sid Hensley. History of Presenting Illness     Chief Complaint   Patient presents with    Cold exposure     Pt arrives via EMS from non-specific outdoor area. Pt used heroin around 1300 today, went to sleep in an abandoned basement of apartment building, and when he woke up he was unable to move bilateral lower legs, toes. Crawled to grassy area and called EMS. Pedal pulses faint. Pale, cold extremities with 3-4 second cap refill. Feet are cold at this time. Chronic IV drug user. History Provided By: Patient    HPI: Sid Hensley., 42 y.o. male with a past medical history significant for medical problems as stated below presents to the ED with cc of difficulty moving his lower extremities. He reports that he is homeless and was sleeping in bed in an abandoned basement. He woke up in the afternoon after using IV heroin and his feet were extremely cold. Reports he has been having back pain has been present today. Denies any sensory changes to his upper extremities or groin. He has been able to urinate without difficulty. Reports that he has diminished sensation of his feet bilaterally. Denies any fever or chills. There are no other associated symptoms. No other exacerbating or ameliorating factors. Has abrasions to feet from crawling in basement. Has some swelling of his right hand    PCP: None    No current facility-administered medications on file prior to encounter. Current Outpatient Medications on File Prior to Encounter   Medication Sig Dispense Refill    HYDROcodone-acetaminophen (NORCO) 5-325 mg per tablet Take 1 Tab by mouth every six (6) hours as needed for Pain for up to 12 doses. Max Daily Amount: 4 Tabs.  12 Tab 0       Past History     Past Medical History:  Past Medical History:   Diagnosis Date    Hypertension      Past Surgical History:  Past Surgical History:   Procedure Laterality Date    NEUROLOGICAL PROCEDURE UNLISTED      injection for a pinched nerve     Family History:  No family history on file. Social History:  Social History     Tobacco Use    Smoking status: Every Day     Packs/day: 1.00     Types: Cigarettes    Smokeless tobacco: Former   Substance Use Topics    Alcohol use: Yes     Comment: social    Drug use: No       Allergies:  No Known Allergies      Review of Systems   Review of Systems   Constitutional:  Negative for appetite change. HENT:  Negative for sore throat. Eyes:  Negative for visual disturbance. Respiratory:  Negative for cough and shortness of breath. Cardiovascular:  Negative for chest pain. Gastrointestinal:  Negative for abdominal pain, diarrhea, nausea and vomiting. Genitourinary:  Negative for difficulty urinating. Musculoskeletal:  Positive for back pain. Negative for myalgias. Skin:  Negative for color change. Neurological:  Positive for numbness. Negative for dizziness and headaches. Difficulty moving toes   Psychiatric/Behavioral:  Negative for agitation. Physical Exam   Physical Exam  Constitutional:       Appearance: Normal appearance. HENT:      Head: Normocephalic and atraumatic. Mouth/Throat:      Mouth: Mucous membranes are moist.   Eyes:      Conjunctiva/sclera: Conjunctivae normal.      Pupils: Pupils are equal, round, and reactive to light. Cardiovascular:      Rate and Rhythm: Normal rate and regular rhythm. Heart sounds: No murmur heard. Pulmonary:      Effort: Pulmonary effort is normal.      Breath sounds: Normal breath sounds. Abdominal:      General: Abdomen is flat. There is no distension. Palpations: Abdomen is soft. Tenderness: There is no guarding or rebound. Musculoskeletal:         General: No swelling. Normal range of motion. Cervical back: Normal range of motion.       Comments: Feet cold bilaterally  1+ DP pulse bl  Abrasions of feet  Swelling and abrasions to both hands R>L   Skin:     General: Skin is warm and dry. Capillary Refill: Capillary refill takes less than 2 seconds. Neurological:      General: No focal deficit present. Mental Status: He is alert and oriented to person, place, and time. Psychiatric:         Mood and Affect: Mood normal.       Diagnostic Study Results     Labs -     Recent Results (from the past 24 hour(s))   CK    Collection Time: 11/26/22  4:48 AM   Result Value Ref Range    CK 1,133 (H) 39 - 308 U/L   RENAL FUNCTION PANEL    Collection Time: 11/26/22  4:48 AM   Result Value Ref Range    Sodium 136 136 - 145 mmol/L    Potassium 4.3 3.5 - 5.1 mmol/L    Chloride 104 97 - 108 mmol/L    CO2 25 21 - 32 mmol/L    Anion gap 7 5 - 15 mmol/L    Glucose 117 (H) 65 - 100 mg/dL    BUN 15 6 - 20 MG/DL    Creatinine 0.81 0.70 - 1.30 MG/DL    BUN/Creatinine ratio 19 12 - 20      eGFR >60 >60 ml/min/1.73m2    Calcium 9.5 8.5 - 10.1 MG/DL    Phosphorus 4.5 2.6 - 4.7 MG/DL    Albumin 3.8 3.5 - 5.0 g/dL   CBC WITH AUTOMATED DIFF    Collection Time: 11/26/22  4:48 AM   Result Value Ref Range    WBC 13.3 (H) 4.1 - 11.1 K/uL    RBC 4.85 4.10 - 5.70 M/uL    HGB 15.3 12.1 - 17.0 g/dL    HCT 44.9 36.6 - 50.3 %    MCV 92.6 80.0 - 99.0 FL    MCH 31.5 26.0 - 34.0 PG    MCHC 34.1 30.0 - 36.5 g/dL    RDW 14.1 11.5 - 14.5 %    PLATELET 145 (H) 465 - 400 K/uL    MPV 10.4 8.9 - 12.9 FL    NRBC 0.0 0  WBC    ABSOLUTE NRBC 0.00 0.00 - 0.01 K/uL    NEUTROPHILS 63 32 - 75 %    LYMPHOCYTES 27 12 - 49 %    MONOCYTES 7 5 - 13 %    EOSINOPHILS 2 0 - 7 %    BASOPHILS 1 0 - 1 %    IMMATURE GRANULOCYTES 0 0.0 - 0.5 %    ABS. NEUTROPHILS 8.4 (H) 1.8 - 8.0 K/UL    ABS. LYMPHOCYTES 3.6 (H) 0.8 - 3.5 K/UL    ABS. MONOCYTES 1.0 0.0 - 1.0 K/UL    ABS. EOSINOPHILS 0.3 0.0 - 0.4 K/UL    ABS. BASOPHILS 0.1 0.0 - 0.1 K/UL    ABS. IMM.  GRANS. 0.1 (H) 0.00 - 0.04 K/UL    DF AUTOMATED     VANCOMYCIN, TROUGH    Collection Time: 11/26/22  8:47 AM   Result Value Ref Range Vancomycin,trough 4.5 (L) 5.0 - 10.0 ug/mL    Reported dose date NOT PROVIDED      Reported dose time: NOT PROVIDED      Reported dose: NOT PROVIDED UNITS       Radiologic Studies -   CTA ABD ART W RUNOFF W WO CONT   Final Result   1.  6.3 x 4.5 cm enhancing left retroperitoneal mass/masses, likely adrenal in   origin. Further evaluation with adrenal mass protocol CT/MRI recommended. Lesion   is amenable to percutaneous biopsy. 2.  Patent bilateral inflow and outflow vessels. 3.  Normal three-vessel runoff bilaterally. XR CHEST PORT   Final Result      Patchy bilateral interstitial opacities can be seen with pulmonary edema or   infection, including atypical/viral etiologies. MRI [facility] SPINE W WO CONT   Final Result   No evidence of epidural abscess. Normal MRI evaluation of the thoracic spine. MRI LUMB SPINE W WO CONT   Final Result   1. Large left adrenal mass incompletely evaluated either representing primary   adrenal tumor or metastatic disease. Pheochromocytoma is occluded. Laboratory   evaluation is recommended including CT of the chest abdomen pelvis to search for   primary   2. Patchy abnormal dorsal paraspinous muscle signal with confluent areas of   postcontrast enhancement. Possible etiologies include an infectious or   inflammatory etiology/myositis without drainable fluid collection. Tumor   infiltration cannot be excluded   3. A discrete epidural collection is not identified. No evidence of discitis            MRI CERV SPINE W WO CONT   Final Result   Multilevel disc and facet degenerative change with congenital narrowing of the   cervical canal.   There is no evidence of epidural abscess. There is moderate canal and severe right foraminal stenosis at C3-4. Moderate canal and mild right foraminal stenosis at C5-6. Additional, less severe degenerative findings are as described in detail above.                      CT Results  (Last 48 hours) None          CXR Results  (Last 48 hours)      None          Medical Decision Making   I am the first provider for this patient. I reviewed the vital signs, available nursing notes, past medical history, past surgical history, family history and social history. Vital Signs-Reviewed the patient's vital signs. Patient Vitals for the past 12 hrs:   Temp Pulse Resp BP SpO2   11/26/22 2344 98.6 °F (37 °C) 75 15 (!) 147/91 98 %   11/26/22 1946 97.9 °F (36.6 °C) 81 16 121/86 100 %   11/26/22 1458 98.8 °F (37.1 °C) 84 16 (!) 146/85 99 %       Records Reviewed: Nursing records and medical records reviewed    Provider Notes (Medical Decision Making):   Patient brought to emergency department by EMS after they found him to have low temperatures. He reports that his biggest concern is inability to move his feet with sensory changes bilaterally. He reports that he is having some back pain as well. Feet are very cold and I suspect that this is environmental in nature. EMS reports that they had originally found him with a temperature of 30 degrees, but here he has a normal temperature at 98.6 Fahrenheit. Suspect cold injury, but feet do not appear like they have overt frostbite. Patient reports that he has been having back pain and has an IV drug user so will obtain MRI of entire spine for further eval.  Possible that patient threw emboli to feel, but this overall seems less likely as symptoms are bilateral.  Will obtain lab work with CBC, CMP, sed rate, and CK. We will rewarm patient's feet with 37 to 39 °C water. If no improvement in symptoms. Will update tetanus. ED Course:   Initial assessment performed. The patients presenting problems have been discussed, and they are in agreement with the care plan formulated and outlined with them. I have encouraged them to ask questions as they arise throughout their visit.     ED Course as of 11/27/22 0142   Tue Nov 22, 2022   5588 Patient able to move right foot where she was before. Has sensation bilaterally which she did not have on previous exam.  MRI still pending at this time. Lab work was not able to be obtained prior to MRI, so we will obtain this. Patient will be signed out to Dr. Stacy Madison for follow-up work-up and determine final disposition. Feet are warmer. [WB]   1830 Patient with concerning finding of increased T2 signal within the paraspinal musculature in the lumbar spine with left greater than right. Have consulted neurosurgery with concerns for cord compression. [WB]   Wed Nov 23, 2022   0030 Patient reevaluated. His left foot is now warm and has good PT pulse. I could not obtain a pulse of the right lower extremity with an ultrasound. Will obtain CT of aorta with runoff bilaterally to evaluate for any emboli or other vascular disease [WB]   0358 Received signout from Dr. Carey Grijalva at 12:30 AM awaiting CTA of abdomen pelvis with runoffs and all labs which have just been sent. Patient still complaining of weakness in bilateral lower extremities and inability to walk. Dr. Carey Grijalva was unable to palpate pulse in right lower extremity. Labs reviewed. Patient has leukocytosis with white count of 13.6. No left shift. CMP shows sodium of 134. Mild HARPAL with creatinine of 1.4. LFTs moderately elevated with ALT of 148 and AST of 375. Bilirubin is normal..  Blood cultures and lactate obtained. CPK is markedly elevated at 11,686. Sed rate is normal at 18. Urine drug screen positive for opiates and cocaine. Chest x-ray shows bilateral interstitial opacities consistent with edema versus viral etiology. Will send COVID and flu swabs.   Case discussed with Dr. Hakeem Raphael (hospitalist) who will see and admit patient. [AO]      ED Course User Index  [AO] Romina Reddy MD  [WB] Shannon Lakhani MD       Medications Administered       gadoteridoL (PROHANCE) 279.3 mg/mL contrast solution 14 mL       Admin Date  11/22/2022 Action  Given Dose  14 mL Route  IntraVENous Administered By  [facility]                  Critical Care:  None    Disposition:  Admission    Diagnosis     Clinical Impression:   1. Non-traumatic rhabdomyolysis    2. Leg weakness, bilateral    3. IV drug abuse ([facility] [address]    4. Foot drop, bilateral [M21.371, M21.372 (ICD-10-CM)]      Attestations:    Sharon Boateng MD    Please note that this dictation was completed with [employer], the computer voice recognition software. Quite often unanticipated grammatical, syntax, homophones, and other interpretive errors are inadvertently transcribed by the computer software. Please disregard these errors. Please excuse any errors that have escaped final proofreading. Thank you.

## 2022-11-23 NOTE — ED NOTES
This RN attempted to call report again at 1901. Placed on hold by [address] for 6 minutes. Charge nurse Xenia notified at this time; reports receving nurse is not available. Pod 1 ED extension provided. Xenia says nurse will have to call me back.

## 2022-11-23 NOTE — H&P
Hospitalist Admission Note    NAME:  Trisha Kumar :   1985   MRN:   [id number]     Date of admit: 2022    PCP: None    Assessment/Plan:     Rhabdomyolysis POA CPK 11,686  ? Myositis or rhabdomyolysis related lumbar paraspinous muscles changes POA  Elevated HS troponin 528 POA suspect related to rhabdomyolysis  No CP  EKG NSR , rate 87, normal axis, no LVH   Incomplete right BBB   Non specific ST-T changes  Creatinine mildly increased 1.4  IVF, watch respiratory status  Serial labs, consult renal with mildly elevated creatinine,  significant rhabdo  ASA, hold statin with rhabdomyolysis  No B-blocker with cocaine use  Hold ACE In   Check echo    CXR with Edema or atypical pneumonia POA  Significance unclear  No cough or SOB  Currently on RA  Rapid COVID-19 negative  Influenza A/b antigen negative  Check respiratory PCR  Check pBNP  Check echo    Inability to move feet bilaterally POA  Onset x 1 day  Able to lift legs, but cannot dorsiflex or plantar flex feet  Calf and thigh not tender or firm    Legs ache  No HA  DP and PT pulses 2+ and symmetric  CTA abdomen with LE runoff IMPRESSION  1.  6.3 x 4.5 cm enhancing left retroperitoneal mass/masses, likely adrenal in  origin. Further evaluation with adrenal mass protocol CT/MRI recommended. Lesion  is amenable to percutaneous biopsy. 2.  Patent bilateral inflow and outflow vessels. 3.  Normal three-vessel runoff bilaterally. MRI C-spine       Multilevel disc and facet degenerative change with congenital narrowing of the            cervical canal.       There is no evidence of epidural abscess. There is moderate canal and severe right foraminal stenosis at C3-4. MRI thoracic spine   No evidence of epidural abscess. Normal MRI evaluation of the thoracic spine. MRI lumbar spine IMPRESSION  1. Large left adrenal mass incompletely evaluated either representing primary  adrenal tumor or metastatic disease. Pheochromocytoma is occluded. Laboratory  evaluation is recommended including CT of the chest abdomen pelvis to search for  primary  2. Patchy abnormal dorsal paraspinous muscle signal with confluent areas of  postcontrast enhancement. Possible etiologies include an infectious or  inflammatory etiology/myositis without drainable fluid collection. Tumor  infiltration cannot be excluded  3. A discrete epidural collection is not identified. No evidence of discitis  Etiology unclear  ? Bilateral calf compartment syndrome naveed with the rhabdomyolysis   Seems unlikely    No evidence of spinal cord process    Seen by NSGY in ED              Circulation intact   ? Brain imaging of any value   ? Peripheral nerve injury  IVF for rhabdo  Ask orthopedics to see, ? Compartment syndrome  Ask neurology to see  PT/OT consults    ? Left adrenal mass 6.3 x 4.5 cm enhancing POA  Noted incidentally on CTA scans and lumbar MRI  Left retroperitoneal mass/masses, likely adrenal in origin    Essential HTN POA  PRN hydralazine    Drug screen + for cocaine and opiates  Uses intranasal, denies IVDA    Tobacco use < 1 PPD  Requests nicotine patch    Overweight POA Body mass index is 26.29 kg/m². Given the patient's current clinical presentation, I have a high level of concern for decompensation if discharged from the emergency department. My assessment of this patient's clinical condition and my plan of care is as noted above. DVT prophylaxis with Lovenox    Code status: Full code  NOK:    History     CHIEF COMPLAINT: \"I cannot move either foot x days\"    HISTORY OF PRESENT ILLNESS:    42 Y. O Male    Essential hypertension not on current Rx    Currently homeless, lives in people basements, sleeps on hard surfaces    Admits to passing out recently    Snorts morphine and cocaine, denies IVDA    Woke up this morning, moderate aching in his back  Legs \"would not work\", had to drag himself across ground to crawl   Scrapped up toes dragging his feet  Came to the ED as his feet \"will not move\"  No HA or SOB or cough or CP or abdominal pain  No fevers, N/V, diarrhea          Past Medical History:   Diagnosis Date    Hypertension         Past Surgical History:   Procedure Laterality Date    NEUROLOGICAL PROCEDURE UNLISTED      injection for a pinched nerve       Social History     Tobacco Use    Smoking status: Every Day     Packs/day: 1.00     Types: Cigarettes    Smokeless tobacco: Former   Substance Use Topics    Alcohol use: Yes     Comment: social        Family history:  4 sons healthy  Mom  depression  Father's medical history is unknown  Brother  of heroin overdose    No Known Allergies     Prior to Admission medications    Medication Sig Start Date End Date Taking? Authorizing Provider   HYDROcodone-acetaminophen (NORCO) 5-325 mg per tablet Take 1 Tab by mouth every six (6) hours as needed for Pain for up to 12 doses. Max Daily Amount: 4 Tabs.  18   PINKY Chang       Review of symptoms:     POSITIVE= Bold  Negative = not bold  General:  fever, chills, sweats, generalized weakness, weight loss     loss of appetite   Eyes:    blurred vision, eye pain, double vision  ENT:    Coryza, sore throat, trouble swallowing  Respiratory:   cough, sputum, SOB  Cardiology:   chest pain, orthopnea, PND, edema  Gastrointestinal:  abdominal pain , N/V, diarrhea, constipation, melena or BRBPR  Genitourinary:  Urgency, dysuria, hematuria  Muskuloskeletal :  Joint redness, swelling or acute joint pain, myalgias in low back/thighs  Hematology:  easy bruising, nose or gum bleeding  Dermatological: rash, Scraps on Toes bilaterally  Endocrine:   Polyuria or polydipsia, heat or hold intolerance  Neurological:  Headache, Unable to move feet bilaterally     Speech difficulties, memory loss  Psychological: depression, agitation      Objective:   VITALS:    Patient Vitals for the past 24 hrs:   Temp Pulse Resp BP SpO2   22 0751 98.2 °F (36.8 °C) 80 16 (!) 165/76 99 %   22 0559 98.3 °F (36.8 °C) 84 12 (!) 171/83 94 %   22 0459 -- 86 20 (!) 155/83 95 %   22 0349 -- 88 22 (!) 183/80 95 %   22 0249 -- 89 -- 138/65 94 %   22 0029 99 °F (37.2 °C) -- -- (!) 161/81 95 %   22 2049 98.6 °F (37 °C) 87 10 (!) 159/77 95 %     Temp (24hrs), Av.5 °F (36.9 °C), Min:98.2 °F (36.8 °C), Max:99 °F (37.2 °C)      O2 Device: None (Room air)    Wt Readings from Last 12 Encounters:   22 80.7 kg (178 lb)   18 72 kg (158 lb 11.7 oz)   17 72.6 kg (160 lb)   16 75.8 kg (167 lb 2 oz)   12/10/13 75.5 kg (166 lb 6.4 oz)   13 69 kg (152 lb 1.9 oz)   12 71.7 kg (158 lb)   12 72.6 kg (160 lb)   12 72.6 kg (160 lb)   12 73.8 kg (162 lb 12.8 oz)   12 72 kg (158 lb 11.7 oz)   12 73.7 kg (162 lb 7.7 oz)         PHYSICAL EXAM:     I had a face to face encounter and independently examined this patient on 22  as outlined below:    General:    Alert, cooperative in no distress     HEENT: Normocephalic, atraumatic    PERRL, EOMI  Sclera no icterus    Nasal mucosa without masses or discharge  Hearing intact to voice    Oropharynx without erythema or exudate  No thrush  Pink MM  Neck:  No meningismus, trachea midline, no carotid bruits     Thyroid not enlarged, no nodules or tenderness  Lungs:   Clear to auscultation bilaterally. No wheezing or rales    No accessory muscle use or retractions. Heart:   Regular rate and rhythm,  no murmur or gallop. No LE edema     Dorsal pedis, Posterior tibial  2+ and symmetric  Abdomen:   Soft, non-tender. Not distended. Bowel sounds normal.     No masses, No Hepatosplenomegaly, No Rebound or guarding  Lymph nodes: No cervical or inguinal BECCA  Musculoskeletal:  No Joint swelling, erythema, warmth.  No Cyanosis or clubbing  Skin:     Abrasions on surfaces of toes bilaterally    Not Jaundiced   No nodules or thickening    Capillary refill normal  Neurologic: Alert and oriented X 3, follows commands     Cranial nerves 2 to 12 intact    Able to lift legs off bed, not able to dorsifex or plantar flex his legs       LAB DATA REVIEWED:    Recent Results (from the past 12 hour(s))   CULTURE, BLOOD    Collection Time: 11/23/22 12:00 AM    Specimen: Blood   Result Value Ref Range    Special Requests: NO SPECIAL REQUESTS      Culture result: NO GROWTH AFTER 7 HOURS     CBC WITH AUTOMATED DIFF    Collection Time: 11/23/22 12:11 AM   Result Value Ref Range    WBC 13.6 (H) 4.1 - 11.1 K/uL    RBC 4.82 4.10 - 5.70 M/uL    HGB 15.0 12.1 - 17.0 g/dL    HCT 43.6 36.6 - 50.3 %    MCV 90.5 80.0 - 99.0 FL    MCH 31.1 26.0 - 34.0 PG    MCHC 34.4 30.0 - 36.5 g/dL    RDW 14.1 11.5 - 14.5 %    PLATELET 545 702 - 265 K/uL    MPV 10.1 8.9 - 12.9 FL    NRBC 0.0 0  WBC    ABSOLUTE NRBC 0.00 0.00 - 0.01 K/uL    NEUTROPHILS 72 32 - 75 %    LYMPHOCYTES 19 12 - 49 %    MONOCYTES 9 5 - 13 %    EOSINOPHILS 0 0 - 7 %    BASOPHILS 0 0 - 1 %    IMMATURE GRANULOCYTES 0 0.0 - 0.5 %    ABS. NEUTROPHILS 9.9 (H) 1.8 - 8.0 K/UL    ABS. LYMPHOCYTES 2.5 0.8 - 3.5 K/UL    ABS. MONOCYTES 1.2 (H) 0.0 - 1.0 K/UL    ABS. EOSINOPHILS 0.0 0.0 - 0.4 K/UL    ABS. BASOPHILS 0.0 0.0 - 0.1 K/UL    ABS. IMM. GRANS. 0.0 0.00 - 0.04 K/UL    DF AUTOMATED     METABOLIC PANEL, COMPREHENSIVE    Collection Time: 11/23/22 12:11 AM   Result Value Ref Range    Sodium 134 (L) 136 - 145 mmol/L    Potassium 4.4 3.5 - 5.1 mmol/L    Chloride 100 97 - 108 mmol/L    CO2 25 21 - 32 mmol/L    Anion gap 9 5 - 15 mmol/L    Glucose 87 65 - 100 mg/dL    BUN 30 (H) 6 - 20 MG/DL    Creatinine 1.40 (H) 0.70 - 1.30 MG/DL    BUN/Creatinine ratio 21 (H) 12 - 20      eGFR >60 >60 ml/min/1.73m2    Calcium 8.7 8.5 - 10.1 MG/DL    Bilirubin, total 0.6 0.2 - 1.0 MG/DL    ALT (SGPT) 148 (H) 12 - 78 U/L    AST (SGOT) 375 (H) 15 - 37 U/L    Alk.  phosphatase 87 45 - 117 U/L    Protein, total 7.8 6.4 - 8.2 g/dL    Albumin 3.8 3.5 - 5.0 g/dL    Globulin 4.0 2.0 - 4.0 g/dL    A-G Ratio 1.0 (L) 1.1 - 2.2     SED RATE (ESR)    Collection Time: 11/23/22 12:11 AM   Result Value Ref Range    Sed rate, automated 18 (H) 0 - 15 mm/hr   CK    Collection Time: 11/23/22 12:11 AM   Result Value Ref Range    CK 11,686 (HH) 39 - 308 U/L   CULTURE, BLOOD    Collection Time: 11/23/22 12:15 AM    Specimen: Blood   Result Value Ref Range    Special Requests: NO SPECIAL REQUESTS      Culture result: NO GROWTH AFTER 7 HOURS     TROPONIN-HIGH SENSITIVITY    Collection Time: 11/23/22  2:36 AM   Result Value Ref Range    Troponin-High Sensitivity 528 (HH) 0 - 76 ng/L   URINALYSIS W/ REFLEX CULTURE    Collection Time: 11/23/22  2:36 AM    Specimen: Urine   Result Value Ref Range    Color YELLOW/STRAW      Appearance CLEAR CLEAR      Specific gravity 1.021      pH (UA) 5.0 5.0 - 8.0      Protein 30 (A) NEG mg/dL    Glucose Negative NEG mg/dL    Ketone 15 (A) NEG mg/dL    Bilirubin Negative NEG      Blood LARGE (A) NEG      Urobilinogen 0.2 0.2 - 1.0 EU/dL    Nitrites Negative NEG      Leukocyte Esterase Negative NEG      WBC 0-4 0 - 4 /hpf    RBC 0-5 0 - 5 /hpf    Epithelial cells FEW FEW /lpf    Bacteria 1+ (A) NEG /hpf    UA:UC IF INDICATED CULTURE NOT INDICATED BY UA RESULT CNI      Granular cast 2-5 (A) NEG /lpf   DRUG SCREEN, URINE    Collection Time: 11/23/22  2:36 AM   Result Value Ref Range    AMPHETAMINES Negative NEG      BARBITURATES Negative NEG      BENZODIAZEPINES Negative NEG      COCAINE Positive (A) NEG      METHADONE Negative NEG      OPIATES Positive (A) NEG      PCP(PHENCYCLIDINE) Negative NEG      THC (TH-CANNABINOL) Negative NEG      Drug screen comment (NOTE)    COVID-19 RAPID TEST    Collection Time: 11/23/22  3:39 AM   Result Value Ref Range    Specimen source Nasopharyngeal      COVID-19 rapid test Not detected NOTD     INFLUENZA A+B VIRAL AGS    Collection Time: 11/23/22  3:39 AM   Result Value Ref Range    Influenza A Antigen Negative NEG      Influenza B Antigen Negative NEG     EKG, 12 LEAD, INITIAL    Collection Time: 11/23/22  3:47 AM   Result Value Ref Range    Ventricular Rate 87 BPM    Atrial Rate 87 BPM    P-R Interval 116 ms    QRS Duration 96 ms    Q-T Interval 406 ms    QTC Calculation (Bezet) 488 ms    Calculated P Axis -3 degrees    Calculated R Axis 44 degrees    Calculated T Axis 23 degrees    Diagnosis       Normal sinus rhythm  Incomplete right bundle branch block  Prolonged QT  No previous ECGs available         CT Results  (Last 48 hours)                 11/23/22 0227  CTA ABD ART W RUNOFF W WO CONT Preliminary result      This result has not been signed. Information might be incomplete. Narrative:  PRELIMINARY REPORT       Normal bilateral lower leg 3 vessel runoff. Left adrenal mass containing   calcification. Preliminary report was provided by Dr. Carli Aguayo, the on-call radiologist, on   11/23/2022 at 0300 hours. Final report to follow. END PRELIMINARY REPORT                 MRI CERV SPINE W WO CONT    Result Date: 11/22/2022  Multilevel disc and facet degenerative change with congenital narrowing of the cervical canal. There is no evidence of epidural abscess. There is moderate canal and severe right foraminal stenosis at C3-4. Moderate canal and mild right foraminal stenosis at C5-6. Additional, less severe degenerative findings are as described in detail above. MRI [facility] SPINE W WO CONT    Result Date: 11/22/2022  No evidence of epidural abscess. Normal MRI evaluation of the thoracic spine. MRI LUMB SPINE W WO CONT    Result Date: 11/23/2022  1. Large left adrenal mass incompletely evaluated either representing primary adrenal tumor or metastatic disease. Pheochromocytoma is occluded. Laboratory evaluation is recommended including CT of the chest abdomen pelvis to search for primary 2. Patchy abnormal dorsal paraspinous muscle signal with confluent areas of postcontrast enhancement.  Possible etiologies include an infectious or inflammatory etiology/myositis without drainable fluid collection. Tumor infiltration cannot be excluded 3. A discrete epidural collection is not identified. No evidence of discitis     XR CHEST PORT    Result Date: 11/23/2022  Patchy bilateral interstitial opacities can be seen with pulmonary edema or infection, including atypical/viral etiologies. I saw the patient personally, took a history and did a complete physical exam at the bedside. I performed complex decision making in coming up with a diagnostic and treatment plan for the patient. I reviewed the patient's past medical records, current laboratory and radiology results, and actual Xray films/EKG. I have also discussed this case with the involved ED physician.     Care Plan discussed with:    Patient, Family, ED Doc    Risk of deterioration:  High    Total Time Coordinating Admission:    65  minutes    Total Critical Care Time:         Mackenzie Ortega MD

## 2022-11-23 NOTE — CONSULTS
Ortho:    Urgent consult - RO LE compartment syndrome  Pt arrived in the ER 11/22  No known trauma per pt    Per patient: he passed out(after polysubstance use) in the basement when her lives(homeless), awoke with bilat LE pain and numbness, unable to walk, had to crawl up the steps. MRI of the spine reviewed by neurosurgery(Dr Jaya Santos- No evidence of significant diskitis/osteomyelitis, ed abscess or compressive spinal lesion. No neurosurgical intervention), neurology-  LE weakness, paresthesia     No LE swelling, deformity or muscular atrophy. No LE swelling or edema. No knee effusions  Dorsal toes(bilat, 1st and 2nd) with superficial abrasions  Intact hip and knee flex, hip abd/add intact.   Intact EHL/FHL, trace plant/dorsiflexion  Supple PROM OF the LE joints bilaterally  No sig of LE DVT  Intact, although abnormal sensation to light touch throughout the LE bilat     NO concern for LE compartment syndrome  Bilat symmetric(below knee) LE parasthesia, palsy    NO orthopedic intervention  Sign off    Pt seen by Dr Micah Ardon PA-C

## 2022-11-23 NOTE — CONSULTS
No evidence of significant diskitis/osteomyelitis, ed abscess or compressive spinal lesion.   No neurosurgical intervention

## 2022-11-23 NOTE — ED NOTES
This RN attempted to call report; Floor reports nurse \"will have to call you back. \" Estimate given was \"probably 5-10 minutes. \"
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
## 2022-11-23 NOTE — ADT AUTH CERT NOTES
Melvi Poe MD   Physician   Internal Medicine   H&P       Signed   Date of Service:  22 0956                 Expand All Collapse All                                                                                                                                                                                                                                                                                                           Hospitalist Admission Note     NAME:            Thomas Baker. :               1985   MRN:               [id number]      Date of admit: 2022     PCP:    None     Assessment/Plan:      Rhabdomyolysis POA CPK 11,686  ? Myositis or rhabdomyolysis related lumbar paraspinous muscles changes POA  Elevated HS troponin 528 POA suspect related to rhabdomyolysis  No CP  EKG NSR , rate 87, normal axis, no LVH              Incomplete right BBB              Non specific ST-T changes  Creatinine mildly increased 1.4  IVF, watch respiratory status  Serial labs, consult renal with mildly elevated creatinine,  significant rhabdo  ASA, hold statin with rhabdomyolysis  No B-blocker with cocaine use  Hold ACE In   Check echo     CXR with Edema or atypical pneumonia POA  Significance unclear  No cough or SOB  Currently on RA  Rapid COVID-19 negative  Influenza A/b antigen negative  Check respiratory PCR  Check pBNP  Check echo     Inability to move feet bilaterally POA  Onset x 1 day  Able to lift legs, but cannot dorsiflex or plantar flex feet  Calf and thigh not tender or firm               Legs ache  No HA  DP and PT pulses 2+ and symmetric  CTA abdomen with LE runoff IMPRESSION  1.  6.3 x 4.5 cm enhancing left retroperitoneal mass/masses, likely adrenal in  origin. Further evaluation with adrenal mass protocol CT/MRI recommended. Lesion  is amenable to percutaneous biopsy. 2.  Patent bilateral inflow and outflow vessels. 3.  Normal three-vessel runoff bilaterally.   MRI C-spine               Multilevel disc and facet degenerative change with congenital narrowing of the                   cervical canal.              There is no evidence of epidural abscess. There is moderate canal and severe right foraminal stenosis at C3-4. MRI thoracic spine   No evidence of epidural abscess. Normal MRI evaluation of the thoracic spine. MRI lumbar spine IMPRESSION  1. Large left adrenal mass incompletely evaluated either representing primary  adrenal tumor or metastatic disease. Pheochromocytoma is occluded. Laboratory  evaluation is recommended including CT of the chest abdomen pelvis to search for  primary  2. Patchy abnormal dorsal paraspinous muscle signal with confluent areas of  postcontrast enhancement. Possible etiologies include an infectious or  inflammatory etiology/myositis without drainable fluid collection. Tumor  infiltration cannot be excluded  3. A discrete epidural collection is not identified. No evidence of discitis  Etiology unclear  ? Bilateral calf compartment syndrome naveed with the rhabdomyolysis              Seems unlikely              No evidence of spinal cord process                          Seen by NSGY in ED              Circulation intact              ? Brain imaging of any value              ? Peripheral nerve injury  IVF for rhabdo  Ask orthopedics to see, ? Compartment syndrome  Ask neurology to see  PT/OT consults     ? Left adrenal mass 6.3 x 4.5 cm enhancing POA  Noted incidentally on CTA scans and lumbar MRI  Left retroperitoneal mass/masses, likely adrenal in origin     Essential HTN POA  PRN hydralazine     Drug screen + for cocaine and opiates  Uses intranasal, denies IVDA     Tobacco use < 1 PPD  Requests nicotine patch     Overweight POA Body mass index is 26.29 kg/m². Given the patient's current clinical presentation, I have a high level of concern for decompensation if discharged from the emergency department.   My assessment of this patient's clinical condition and my plan of care is as noted above. DVT prophylaxis with Lovenox     Code status: Full code  NOK:     History      CHIEF COMPLAINT: \"I cannot move either foot x days\"     HISTORY OF PRESENT ILLNESS:     42 Y. O Male     Essential hypertension not on current Rx     Currently homeless, lives in people basements, sleeps on hard surfaces     Admits to passing out recently     Snorts morphine and cocaine, denies IVDA     Woke up this morning, moderate aching in his back  Legs \"would not work\", had to drag himself across ground to crawl              Scrapped up toes dragging his feet  Came to the ED as his feet \"will not move\"  No HA or SOB or cough or CP or abdominal pain  No fevers, N/V, diarrhea                   Past Medical History:   Diagnosis Date    Hypertension                 Past Surgical History:   Procedure Laterality Date    NEUROLOGICAL PROCEDURE UNLISTED         injection for a pinched nerve         Social History            Tobacco Use    Smoking status: Every Day       Packs/day: 1.00       Types: Cigarettes    Smokeless tobacco: Former   Substance Use Topics    Alcohol use: Yes       Comment: social         Family history:  4 sons healthy  Mom  depression  Father's medical history is unknown  Brother  of heroin overdose     No Known Allergies              Prior to Admission medications    Medication Sig Start Date End Date Taking? Authorizing Provider   HYDROcodone-acetaminophen (NORCO) 5-325 mg per tablet Take 1 Tab by mouth every six (6) hours as needed for Pain for up to 12 doses. Max Daily Amount: 4 Tabs.  18     PINKY Foster Cap         Review of symptoms:                                      POSITIVE= Bold  Negative = not bold  General:                     fever, chills, sweats, generalized weakness, weight loss                                      loss of appetite   Eyes:                           blurred vision, eye pain, double vision  ENT: Coryza, sore throat, trouble swallowing  Respiratory:               cough, sputum, SOB  Cardiology:                chest pain, orthopnea, PND, edema  Gastrointestinal:       abdominal pain , N/V, diarrhea, constipation, melena or BRBPR  Genitourinary:           Urgency, dysuria, hematuria  Muskuloskeletal :      Joint redness, swelling or acute joint pain, myalgias in low back/thighs  Hematology:              easy bruising, nose or gum bleeding  Dermatological:         rash, Scraps on Toes bilaterally  Endocrine:                 Polyuria or polydipsia, heat or hold intolerance  Neurological:             Headache, Unable to move feet bilaterally                                      Speech difficulties, memory loss  Psychological:          depression, agitation        Objective:   VITALS:    Patient Vitals for the past 24 hrs:    Temp Pulse Resp BP SpO2   22 0751 98.2 °F (36.8 °C) 80 16 (!) 165/76 99 %   22 0559 98.3 °F (36.8 °C) 84 12 (!) 171/83 94 %   22 0459 -- 86 20 (!) 155/83 95 %   22 0349 -- 88 22 (!) 183/80 95 %   22 0249 -- 89 -- 138/65 94 %   22 0029 99 °F (37.2 °C) -- -- (!) 161/81 95 %   22 2049 98.6 °F (37 °C) 87 10 (!) 159/77 95 %      Temp (24hrs), Av.5 °F (36.9 °C), Min:98.2 °F (36.8 °C), Max:99 °F (37.2 °C)       O2 Device: None (Room air)         Wt Readings from Last 12 Encounters:   22 80.7 kg (178 lb)   18 72 kg (158 lb 11.7 oz)   17 72.6 kg (160 lb)   16 75.8 kg (167 lb 2 oz)   12/10/13 75.5 kg (166 lb 6.4 oz)   13 69 kg (152 lb 1.9 oz)   12 71.7 kg (158 lb)   12 72.6 kg (160 lb)   12 72.6 kg (160 lb)   12 73.8 kg (162 lb 12.8 oz)   12 72 kg (158 lb 11.7 oz)   12 73.7 kg (162 lb 7.7 oz)            PHYSICAL EXAM:      I had a face to face encounter and independently examined this patient on 22  as outlined below:     General:          Alert, cooperative in no distress     HEENT:           Normocephalic, atraumatic    PERRL, EOMI  Sclera no icterus                          Nasal mucosa without masses or discharge  Hearing intact to voice                          Oropharynx without erythema or exudate  No thrush  Pine Air MM  Neck:               No meningismus, trachea midline, no carotid bruits                           Thyroid not enlarged, no nodules or tenderness  Lungs:             Clear to auscultation bilaterally. No wheezing or rales                          No accessory muscle use or retractions. Heart:              Regular rate and rhythm,  no murmur or gallop. No LE edema                          Dorsal pedis, Posterior tibial  2+ and symmetric  Abdomen:        Soft, non-tender. Not distended. Bowel sounds normal.                           No masses, No Hepatosplenomegaly, No Rebound or guarding  Lymph nodes: No cervical or inguinal BECCA  Musculoskeletal:  No Joint swelling, erythema, warmth.  No Cyanosis or clubbing  Skin:                Abrasions on surfaces of toes bilaterally                          Not Jaundiced   No nodules or thickening                          Capillary refill normal  Neurologic:      Alert and oriented X 3, follows commands                           Cranial nerves 2 to 12 intact                          Able to lift legs off bed, not able to dorsifex or plantar flex his legs                   LAB DATA REVIEWED:    Recent Results          Recent Results (from the past 12 hour(s))   CULTURE, BLOOD     Collection Time: 11/23/22 12:00 AM     Specimen: Blood   Result Value Ref Range     Special Requests: NO SPECIAL REQUESTS       Culture result: NO GROWTH AFTER 7 HOURS     CBC WITH AUTOMATED DIFF     Collection Time: 11/23/22 12:11 AM   Result Value Ref Range     WBC 13.6 (H) 4.1 - 11.1 K/uL     RBC 4.82 4.10 - 5.70 M/uL     HGB 15.0 12.1 - 17.0 g/dL     HCT 43.6 36.6 - 50.3 %     MCV 90.5 80.0 - 99.0 FL     MCH 31.1 26.0 - 34.0 PG     MCHC 34.4 30.0 - 36.5 g/dL     RDW 14.1 11.5 - 14.5 %     PLATELET 417 169 - 509 K/uL     MPV 10.1 8.9 - 12.9 FL     NRBC 0.0 0  WBC     ABSOLUTE NRBC 0.00 0.00 - 0.01 K/uL     NEUTROPHILS 72 32 - 75 %     LYMPHOCYTES 19 12 - 49 %     MONOCYTES 9 5 - 13 %     EOSINOPHILS 0 0 - 7 %     BASOPHILS 0 0 - 1 %     IMMATURE GRANULOCYTES 0 0.0 - 0.5 %     ABS. NEUTROPHILS 9.9 (H) 1.8 - 8.0 K/UL     ABS. LYMPHOCYTES 2.5 0.8 - 3.5 K/UL     ABS. MONOCYTES 1.2 (H) 0.0 - 1.0 K/UL     ABS. EOSINOPHILS 0.0 0.0 - 0.4 K/UL     ABS. BASOPHILS 0.0 0.0 - 0.1 K/UL     ABS. IMM. GRANS. 0.0 0.00 - 0.04 K/UL     DF AUTOMATED     METABOLIC PANEL, COMPREHENSIVE     Collection Time: 11/23/22 12:11 AM   Result Value Ref Range     Sodium 134 (L) 136 - 145 mmol/L     Potassium 4.4 3.5 - 5.1 mmol/L     Chloride 100 97 - 108 mmol/L     CO2 25 21 - 32 mmol/L     Anion gap 9 5 - 15 mmol/L     Glucose 87 65 - 100 mg/dL     BUN 30 (H) 6 - 20 MG/DL     Creatinine 1.40 (H) 0.70 - 1.30 MG/DL     BUN/Creatinine ratio 21 (H) 12 - 20       eGFR >60 >60 ml/min/1.73m2     Calcium 8.7 8.5 - 10.1 MG/DL     Bilirubin, total 0.6 0.2 - 1.0 MG/DL     ALT (SGPT) 148 (H) 12 - 78 U/L     AST (SGOT) 375 (H) 15 - 37 U/L     Alk.  phosphatase 87 45 - 117 U/L     Protein, total 7.8 6.4 - 8.2 g/dL     Albumin 3.8 3.5 - 5.0 g/dL     Globulin 4.0 2.0 - 4.0 g/dL     A-G Ratio 1.0 (L) 1.1 - 2.2     SED RATE (ESR)     Collection Time: 11/23/22 12:11 AM   Result Value Ref Range     Sed rate, automated 18 (H) 0 - 15 mm/hr   CK     Collection Time: 11/23/22 12:11 AM   Result Value Ref Range     CK 11,686 (HH) 39 - 308 U/L   CULTURE, BLOOD     Collection Time: 11/23/22 12:15 AM     Specimen: Blood   Result Value Ref Range     Special Requests: NO SPECIAL REQUESTS       Culture result: NO GROWTH AFTER 7 HOURS     TROPONIN-HIGH SENSITIVITY     Collection Time: 11/23/22  2:36 AM   Result Value Ref Range     Troponin-High Sensitivity 528 (HH) 0 - 76 ng/L URINALYSIS W/ REFLEX CULTURE     Collection Time: 11/23/22  2:36 AM     Specimen: Urine   Result Value Ref Range     Color YELLOW/STRAW       Appearance CLEAR CLEAR       Specific gravity 1.021       pH (UA) 5.0 5.0 - 8.0       Protein 30 (A) NEG mg/dL     Glucose Negative NEG mg/dL     Ketone 15 (A) NEG mg/dL     Bilirubin Negative NEG       Blood LARGE (A) NEG       Urobilinogen 0.2 0.2 - 1.0 EU/dL     Nitrites Negative NEG       Leukocyte Esterase Negative NEG       WBC 0-4 0 - 4 /hpf     RBC 0-5 0 - 5 /hpf     Epithelial cells FEW FEW /lpf     Bacteria 1+ (A) NEG /hpf     UA:UC IF INDICATED CULTURE NOT INDICATED BY UA RESULT CNI       Granular cast 2-5 (A) NEG /lpf   DRUG SCREEN, URINE     Collection Time: 11/23/22  2:36 AM   Result Value Ref Range     AMPHETAMINES Negative NEG       BARBITURATES Negative NEG       BENZODIAZEPINES Negative NEG       COCAINE Positive (A) NEG       METHADONE Negative NEG       OPIATES Positive (A) NEG       PCP(PHENCYCLIDINE) Negative NEG       THC (TH-CANNABINOL) Negative NEG       Drug screen comment (NOTE)     COVID-19 RAPID TEST     Collection Time: 11/23/22  3:39 AM   Result Value Ref Range     Specimen source Nasopharyngeal       COVID-19 rapid test Not detected NOTD     INFLUENZA A+B VIRAL AGS     Collection Time: 11/23/22  3:39 AM   Result Value Ref Range     Influenza A Antigen Negative NEG       Influenza B Antigen Negative NEG     EKG, 12 LEAD, INITIAL     Collection Time: 11/23/22  3:47 AM   Result Value Ref Range     Ventricular Rate 87 BPM     Atrial Rate 87 BPM     P-R Interval 116 ms     QRS Duration 96 ms     Q-T Interval 406 ms     QTC Calculation (Bezet) 488 ms     Calculated P Axis -3 degrees     Calculated R Axis 44 degrees     Calculated T Axis 23 degrees     Diagnosis           Normal sinus rhythm  Incomplete right bundle branch block  Prolonged QT  No previous ECGs available               CT Results  (Last 48 hours)                    11/23/22 0227   CTA ABD ART W RUNOFF W WO CONT Preliminary result         This result has not been signed. Information might be incomplete. Narrative:   PRELIMINARY REPORT       Normal bilateral lower leg 3 vessel runoff. Left adrenal mass containing   calcification. Preliminary report was provided by Dr. Cain Xavier, the on-call radiologist, on   11/23/2022 at 0300 hours. Final report to follow. END PRELIMINARY REPORT                      MRI CERV SPINE W WO CONT     Result Date: 11/22/2022  Multilevel disc and facet degenerative change with congenital narrowing of the cervical canal. There is no evidence of epidural abscess. There is moderate canal and severe right foraminal stenosis at C3-4. Moderate canal and mild right foraminal stenosis at C5-6. Additional, less severe degenerative findings are as described in detail above. MRI [facility] SPINE W WO CONT     Result Date: 11/22/2022  No evidence of epidural abscess. Normal MRI evaluation of the thoracic spine. MRI LUMB SPINE W WO CONT     Result Date: 11/23/2022  1. Large left adrenal mass incompletely evaluated either representing primary adrenal tumor or metastatic disease. Pheochromocytoma is occluded. Laboratory evaluation is recommended including CT of the chest abdomen pelvis to search for primary 2. Patchy abnormal dorsal paraspinous muscle signal with confluent areas of postcontrast enhancement. Possible etiologies include an infectious or inflammatory etiology/myositis without drainable fluid collection. Tumor infiltration cannot be excluded 3. A discrete epidural collection is not identified. No evidence of discitis      XR CHEST PORT     Result Date: 11/23/2022  Patchy bilateral interstitial opacities can be seen with pulmonary edema or infection, including atypical/viral etiologies. I saw the patient personally, took a history and did a complete physical exam at the bedside.  I performed complex decision making in coming up with a diagnostic and treatment plan for the patient. I reviewed the patient's past medical records, current laboratory and radiology results, and actual Xray films/EKG. I have also discussed this case with the involved ED physician.      Care Plan discussed with:    Patient, Family, ED Doc     Risk of deterioration:  High     Total Time Coordinating Admission:    65  minutes    Total Critical Care Time:           Emeterio Smallwood MD

## 2022-11-24 LAB
ALBUMIN SERPL-MCNC: 3.3 G/DL (ref 3.5–5)
ALBUMIN SERPL-MCNC: 3.3 G/DL (ref 3.5–5)
ALBUMIN/GLOB SERPL: 0.9 {RATIO} (ref 1.1–2.2)
ALP SERPL-CCNC: 71 U/L (ref 45–117)
ALT SERPL-CCNC: 142 U/L (ref 12–78)
ANION GAP SERPL CALC-SCNC: 4 MMOL/L (ref 5–15)
ANION GAP SERPL CALC-SCNC: 4 MMOL/L (ref 5–15)
AST SERPL-CCNC: 280 U/L (ref 15–37)
BASOPHILS # BLD: 0 K/UL (ref 0–0.1)
BASOPHILS NFR BLD: 0 % (ref 0–1)
BILIRUB SERPL-MCNC: 0.4 MG/DL (ref 0.2–1)
BNP SERPL-MCNC: 778 PG/ML
BUN SERPL-MCNC: 18 MG/DL (ref 6–20)
BUN SERPL-MCNC: 18 MG/DL (ref 6–20)
BUN/CREAT SERPL: 21 (ref 12–20)
BUN/CREAT SERPL: 22 (ref 12–20)
CALCIUM SERPL-MCNC: 9.1 MG/DL (ref 8.5–10.1)
CALCIUM SERPL-MCNC: 9.2 MG/DL (ref 8.5–10.1)
CHLORIDE SERPL-SCNC: 104 MMOL/L (ref 97–108)
CHLORIDE SERPL-SCNC: 104 MMOL/L (ref 97–108)
CK SERPL-CCNC: 5431 U/L (ref 39–308)
CO2 SERPL-SCNC: 29 MMOL/L (ref 21–32)
CO2 SERPL-SCNC: 29 MMOL/L (ref 21–32)
COMMENT, HOLDF: NORMAL
COMMENT, HOLDF: NORMAL
CORTIS AM PEAK SERPL-MCNC: 9.7 UG/DL (ref 4.3–22.45)
CREAT SERPL-MCNC: 0.83 MG/DL (ref 0.7–1.3)
CREAT SERPL-MCNC: 0.84 MG/DL (ref 0.7–1.3)
DIFFERENTIAL METHOD BLD: ABNORMAL
EOSINOPHIL # BLD: 0.1 K/UL (ref 0–0.4)
EOSINOPHIL NFR BLD: 1 % (ref 0–7)
ERYTHROCYTE [DISTWIDTH] IN BLOOD BY AUTOMATED COUNT: 14 % (ref 11.5–14.5)
GLOBULIN SER CALC-MCNC: 3.8 G/DL (ref 2–4)
GLUCOSE SERPL-MCNC: 154 MG/DL (ref 65–100)
GLUCOSE SERPL-MCNC: 156 MG/DL (ref 65–100)
HCT VFR BLD AUTO: 39.4 % (ref 36.6–50.3)
HGB BLD-MCNC: 13.7 G/DL (ref 12.1–17)
IMM GRANULOCYTES # BLD AUTO: 0 K/UL (ref 0–0.04)
IMM GRANULOCYTES NFR BLD AUTO: 0 % (ref 0–0.5)
LYMPHOCYTES # BLD: 3.1 K/UL (ref 0.8–3.5)
LYMPHOCYTES NFR BLD: 32 % (ref 12–49)
MCH RBC QN AUTO: 30.9 PG (ref 26–34)
MCHC RBC AUTO-ENTMCNC: 34.8 G/DL (ref 30–36.5)
MCV RBC AUTO: 88.9 FL (ref 80–99)
MONOCYTES # BLD: 1.1 K/UL (ref 0–1)
MONOCYTES NFR BLD: 11 % (ref 5–13)
NEUTS SEG # BLD: 5.5 K/UL (ref 1.8–8)
NEUTS SEG NFR BLD: 56 % (ref 32–75)
NRBC # BLD: 0 K/UL (ref 0–0.01)
NRBC BLD-RTO: 0 PER 100 WBC
PHOSPHATE SERPL-MCNC: 1.3 MG/DL (ref 2.6–4.7)
PLATELET # BLD AUTO: 327 K/UL (ref 150–400)
PMV BLD AUTO: 10.3 FL (ref 8.9–12.9)
POTASSIUM SERPL-SCNC: 4.1 MMOL/L (ref 3.5–5.1)
POTASSIUM SERPL-SCNC: 4.1 MMOL/L (ref 3.5–5.1)
PROCALCITONIN SERPL-MCNC: 4.38 NG/ML
PROT SERPL-MCNC: 7.1 G/DL (ref 6.4–8.2)
RBC # BLD AUTO: 4.43 M/UL (ref 4.1–5.7)
SAMPLES BEING HELD,HOLD: NORMAL
SAMPLES BEING HELD,HOLD: NORMAL
SODIUM SERPL-SCNC: 137 MMOL/L (ref 136–145)
SODIUM SERPL-SCNC: 137 MMOL/L (ref 136–145)
TROPONIN-HIGH SENSITIVITY: 328 NG/L (ref 0–76)
TSH SERPL DL<=0.05 MIU/L-ACNC: 1.4 UIU/ML (ref 0.36–3.74)
VIT B12 SERPL-MCNC: 253 PG/ML (ref 193–986)
WBC # BLD AUTO: 9.7 K/UL (ref 4.1–11.1)

## 2022-11-24 PROCEDURE — 83880 ASSAY OF NATRIURETIC PEPTIDE: CPT

## 2022-11-24 PROCEDURE — 82533 TOTAL CORTISOL: CPT

## 2022-11-24 PROCEDURE — 84443 ASSAY THYROID STIM HORMONE: CPT

## 2022-11-24 PROCEDURE — 74011250637 HC RX REV CODE- 250/637: Performed by: INTERNAL MEDICINE

## 2022-11-24 PROCEDURE — 80053 COMPREHEN METABOLIC PANEL: CPT

## 2022-11-24 PROCEDURE — 80069 RENAL FUNCTION PANEL: CPT

## 2022-11-24 PROCEDURE — 84145 PROCALCITONIN (PCT): CPT

## 2022-11-24 PROCEDURE — 74011250636 HC RX REV CODE- 250/636: Performed by: INTERNAL MEDICINE

## 2022-11-24 PROCEDURE — 74011000250 HC RX REV CODE- 250: Performed by: INTERNAL MEDICINE

## 2022-11-24 PROCEDURE — 82607 VITAMIN B-12: CPT

## 2022-11-24 PROCEDURE — 84484 ASSAY OF TROPONIN QUANT: CPT

## 2022-11-24 PROCEDURE — 83835 ASSAY OF METANEPHRINES: CPT

## 2022-11-24 PROCEDURE — 36415 COLL VENOUS BLD VENIPUNCTURE: CPT

## 2022-11-24 PROCEDURE — 74011000258 HC RX REV CODE- 258: Performed by: INTERNAL MEDICINE

## 2022-11-24 PROCEDURE — 65270000015 HC RM PRIVATE ONCOLOGY

## 2022-11-24 PROCEDURE — 85025 COMPLETE CBC W/AUTO DIFF WBC: CPT

## 2022-11-24 PROCEDURE — 82550 ASSAY OF CK (CPK): CPT

## 2022-11-24 RX ORDER — GUAIFENESIN 100 MG/5ML
81 LIQUID (ML) ORAL DAILY
Status: DISCONTINUED | OUTPATIENT
Start: 2022-11-24 | End: 2022-12-02 | Stop reason: HOSPADM

## 2022-11-24 RX ORDER — SODIUM,POTASSIUM PHOSPHATES 280-250MG
1 POWDER IN PACKET (EA) ORAL 4 TIMES DAILY
Status: COMPLETED | OUTPATIENT
Start: 2022-11-24 | End: 2022-11-25

## 2022-11-24 RX ORDER — VANCOMYCIN 1.75 GRAM/500 ML IN 0.9 % SODIUM CHLORIDE INTRAVENOUS
1750 ONCE
Status: COMPLETED | OUTPATIENT
Start: 2022-11-24 | End: 2022-11-24

## 2022-11-24 RX ORDER — LANOLIN ALCOHOL/MO/W.PET/CERES
3 CREAM (GRAM) TOPICAL
Status: DISCONTINUED | OUTPATIENT
Start: 2022-11-24 | End: 2022-11-27

## 2022-11-24 RX ORDER — MORPHINE SULFATE 2 MG/ML
2 INJECTION, SOLUTION INTRAMUSCULAR; INTRAVENOUS
Status: DISCONTINUED | OUTPATIENT
Start: 2022-11-24 | End: 2022-12-02 | Stop reason: HOSPADM

## 2022-11-24 RX ADMIN — POTASSIUM & SODIUM PHOSPHATES POWDER PACK 280-160-250 MG 1 PACKET: 280-160-250 PACK at 21:27

## 2022-11-24 RX ADMIN — OXYCODONE 5 MG: 5 TABLET ORAL at 20:04

## 2022-11-24 RX ADMIN — SODIUM CHLORIDE 125 ML/HR: 9 INJECTION, SOLUTION INTRAVENOUS at 00:13

## 2022-11-24 RX ADMIN — VANCOMYCIN HYDROCHLORIDE 1000 MG: 1 INJECTION, POWDER, LYOPHILIZED, FOR SOLUTION INTRAVENOUS at 21:27

## 2022-11-24 RX ADMIN — HYDRALAZINE HYDROCHLORIDE 20 MG: 20 INJECTION INTRAMUSCULAR; INTRAVENOUS at 06:16

## 2022-11-24 RX ADMIN — AMLODIPINE BESYLATE 5 MG: 5 TABLET ORAL at 08:33

## 2022-11-24 RX ADMIN — MORPHINE SULFATE 2 MG: 2 INJECTION, SOLUTION INTRAMUSCULAR; INTRAVENOUS at 08:34

## 2022-11-24 RX ADMIN — POTASSIUM & SODIUM PHOSPHATES POWDER PACK 280-160-250 MG 1 PACKET: 280-160-250 PACK at 08:34

## 2022-11-24 RX ADMIN — VANCOMYCIN HYDROCHLORIDE 1750 MG: 10 INJECTION, POWDER, LYOPHILIZED, FOR SOLUTION INTRAVENOUS at 09:27

## 2022-11-24 RX ADMIN — THIAMINE HCL TAB 100 MG 200 MG: 100 TAB at 08:34

## 2022-11-24 RX ADMIN — CEFTRIAXONE 1 G: 1 INJECTION, POWDER, FOR SOLUTION INTRAMUSCULAR; INTRAVENOUS at 08:34

## 2022-11-24 RX ADMIN — SODIUM CHLORIDE, PRESERVATIVE FREE 10 ML: 5 INJECTION INTRAVENOUS at 07:34

## 2022-11-24 RX ADMIN — POTASSIUM & SODIUM PHOSPHATES POWDER PACK 280-160-250 MG 1 PACKET: 280-160-250 PACK at 13:33

## 2022-11-24 RX ADMIN — POTASSIUM & SODIUM PHOSPHATES POWDER PACK 280-160-250 MG 1 PACKET: 280-160-250 PACK at 17:03

## 2022-11-24 RX ADMIN — ASPIRIN 81 MG: 81 TABLET, CHEWABLE ORAL at 09:27

## 2022-11-24 RX ADMIN — SODIUM CHLORIDE, PRESERVATIVE FREE 10 ML: 5 INJECTION INTRAVENOUS at 23:16

## 2022-11-24 RX ADMIN — OXYCODONE 5 MG: 5 TABLET ORAL at 05:04

## 2022-11-24 RX ADMIN — SODIUM CHLORIDE, PRESERVATIVE FREE 10 ML: 5 INJECTION INTRAVENOUS at 13:33

## 2022-11-24 RX ADMIN — MELATONIN 3 MG: at 21:27

## 2022-11-24 RX ADMIN — MORPHINE SULFATE 2 MG: 2 INJECTION, SOLUTION INTRAMUSCULAR; INTRAVENOUS at 15:10

## 2022-11-24 RX ADMIN — OXYCODONE 5 MG: 5 TABLET ORAL at 11:11

## 2022-11-24 NOTE — PROGRESS NOTES
Pharmacy Antimicrobial Kinetic Dosing    Indication for Antimicrobials: SSTI, CAP     Current Regimen of Each Antimicrobial:  Vancomycin - pharmacy to dose; started ; day 1   Ceftriaxone 1g IV q24h, started , day 1    Previous Antimicrobial Therapy:  -    Goal Level: AUC: 400-600 mg/hr/Liter/day    Date Dose & Interval Measured (mcg/mL) Predicted AUC/HEATHER                       Dosing calculator used: [employer] calculator    Significant Positive Cultures:    blood cx NGTD pending    Conditions for Dosing Consideration: None    Labs:  Recent Labs     22   CREA 0.84  0.83 1.40*   BUN 18  18 30*   PCT 4.38  --      Recent Labs     22   WBC 9.7 13.6*     Temp (24hrs), Av.6 °F (37 °C), Min:98.2 °F (36.8 °C), Max:98.8 °F (37.1 °C)        Creatinine Clearance (mL/min):   CrCl (Adjusted Body Weight): 124.8 mL/min   If actual weight < IBW: CrCl (Actual Body Weight) 134.8    Impression/Plan:   Scr improved from admission  Vancomycin 1750 mg loading dose then 1000 mg IV q12h  Anticipated   Antimicrobial stop date pending     Pharmacy will follow daily and adjust medications as appropriate for renal function and/or serum levels.     Thank you,  Dallas Vega, [address]

## 2022-11-24 NOTE — PROGRESS NOTES
adEnjayson of Shift Note    Bedside shift change report given to [facility] (oncoming nurse) by Elmer Haley RN (offgoing nurse). Report included the following information SBAR, Kardex, and MAR    Shift worked: 7a-7p     Shift summary and any significant changes:    Medications given per [facility] and education provided. Patient still complaining of constant leg pain and numbness. PRN oxy given x1 and PRN morphine given x2. Hourly rounding completed and patient is resting quietly at this time.           Elmer Haley RN

## 2022-11-24 NOTE — PROGRESS NOTES
adEnd of Shift Note    Bedside shift change report given to Armin Rodriguez RN (oncoming nurse) by Jennifer Houston LPN (offgoing nurse). Report included the following information SBAR, Kardex, and MAR    Shift worked:  7p-7:30am     Shift summary and any significant changes:    took over care of patient at 0300. Pt is A/Ox4. Pt is complaining of constant pain in lower extremities and back. Current pain PRN medication not relieving pt pain. Pt is anxious, lays down, sits up and repeats. Pt stated legs and back still hurts. Pt uses urinal at bedside, outputs charted. Normal saline continuous 125mL/hr running.     Concerns for physician to address:       Zone phone for oncoming shift:          Jennifer Houston LPN

## 2022-11-24 NOTE — CONSULTS
Date of Consultation:  November 23, 2022    Referring Physician: Harika Smalls    Reason for Consultation:  bilateral foot weakness and pain     Chief Complaint   Patient presents with    Cold exposure     Pt arrives via EMS from non-specific outdoor area. Pt used heroin around 1300 today, went to sleep in an abandoned basement of apartment building, and when he woke up he was unable to move bilateral lower legs, toes. Crawled to grassy area and called EMS. Pedal pulses faint. Pale, cold extremities with 3-4 second cap refill. Feet are cold at this time. Chronic IV drug user. History of Present Illness:   Thania Webb is a 42 y.o. male with history of hypertension, IV drug use, current cocaine and heroin use, homeless presents by EMS with bilateral foot weakness, pain, limited range of motion, decreased sensation complicated by rhabdomyolysis with CK of 11,686. The patient is homeless and states that he had used heroin and cocaine around 1 PM on 11/23 and went to sleep in an abandoned basement of an apartment building. When he woke up he was unable to move his feet. He also noticed significant pain that was limiting his ability to move. He also noticed that his feet felt like they had fallen asleep. He describes numbness distal to the ankles bilaterally as well as a feeling that they are cold and then burning and pain at the same time. He is able to slightly wiggle his toes on the right side but with significant pain. He denies any notable trauma. He states that he does not recall in what position he fell asleep then but that when he woke up he noticed he could not move his feet and that they were numb and painful. He had to drag himself out of the building and called EMS. He does not have any other weakness that is proximal to his ankles. He confirms that it is the pain that is causing the limited range of motion and subsequent weakness.   He denies any difficulty breathing or shortness of breath. Denies any high fever or chills. He denies any back pain or bowel bladder issues. He does report history of IV drug use. He reports that his skin feels very tight. Past Medical History:   Diagnosis Date    Hypertension         Past Surgical History:   Procedure Laterality Date    NEUROLOGICAL PROCEDURE UNLISTED      injection for a pinched nerve        No family history on file. Social History     Tobacco Use    Smoking status: Every Day     Packs/day: 1.00     Types: Cigarettes    Smokeless tobacco: Former   Substance Use Topics    Alcohol use: Yes     Comment: social        No Known Allergies     Prior to Admission medications    Medication Sig Start Date End Date Taking? Authorizing Provider   HYDROcodone-acetaminophen (NORCO) 5-325 mg per tablet Take 1 Tab by mouth every six (6) hours as needed for Pain for up to 12 doses. Max Daily Amount: 4 Tabs.  6/12/18   PINKY Servin       Review of Systems:    General, constitutional: negative  Eyes, vision: negative  Ears, nose, throat: negative  Cardiovascular, heart: negative  Respiratory: negative  Gastrointestinal: negative  Genitourinary: negative  Musculoskeletal: negative  Skin and integumentary: negative  Psychiatric: negative  Endocrine: negative  Neurological: negative, except for HPI  Hematologic/lymphatic: negative  Allergy/immunology: negative    []Unable to obtain  ROS due to  []mental status change  []sedated   []intubated      PHYSICAL EXAMINATION:   Visit Vitals  BP (!) 169/94 (BP 1 Location: Right upper arm, BP Patient Position: Supine)   Pulse 80   Temp 98.8 °F (37.1 °C)   Resp 17   Ht 5' 9\" (1.753 m)   Wt 178 lb (80.7 kg)   SpO2 99%   BMI 26.29 kg/m²       Physical Exam:  General:  no acute distress  Neck: supple no mass   Lungs: Comfortable on room air  Heart: Well-perfused  Lower extremity: Edema distal to the ankles bilaterally  Skin: Rashes and scabs around his toes due to injury    Neurological exam:  Mental Status: Awake, alert, oriented to person, place and time  Attention and Concentration: able to state the days of the week backwards   Speech and Language: No dysarthria. Able to name, repeat and follow commands   Fund of knowledge was preserved    Cranial nerves: II-XII:  Pupils equal and reactive, visual fields intact by confrontation   Extraocular movements intact, no evidence of nystagmus or ptosis   Facial sensation intact to light touch  Facial movements symmetric; no facial droop  Hearing intact to soft rub bilaterally   Shoulder shrug symmetric and strong   Tongue protrusion full and midline without fasciculation or atrophy    Motor:   Normal tone and Bulk   Drift: No evidence of pronator drift   Finger tapping equal and symmetric    Patient has scabs along his dorsal aspect of his toes bilaterally. Minimal edema noted in the bilateral feet. Tender to touch distal to the ankles. Has passive range of motion. Strength testing: Limited by pain   deltoid triceps biceps Wrist ext. Wrist flex. intrinsics   Right 5 5 5 5 5 5   Left 5 5 5 5 5 5      Hip flex. Hip ext. Knee ext. Knee flex Dorsi flex Plantar flex   Right  5 5 5 5 0 0   Left  5 5 5 5 0 0       Sensory: Diminished to pinprick distal to the ankles, diminished to light touch distal to ankles bilaterally, diminished to vibration at the great toe and medial malleolus. Diminished to proprioception in the bilateral lower extremities. Reflexes:   Biceps Triceps  Brachiorad Patellar Achilles Plantar Hoffmans   Right  2 2 2 2 Absent  Down NT   Left  2 2 2 2 absent Down NT     Cerebellar testing:  No dysmetria. Normal rapid alternating movements; finger-to-nose and heel-to- shin testing are within normal limits.     Patient unable to walk due to pain      Data:     Lab Results   Component Value Date/Time    Sodium 134 (L) 11/23/2022 12:11 AM    Potassium 4.4 11/23/2022 12:11 AM    Chloride 100 11/23/2022 12:11 AM    Glucose 87 11/23/2022 12:11 AM    BUN 30 (H) 11/23/2022 12:11 AM    Creatinine 1.40 (H) 11/23/2022 12:11 AM    Calcium 8.7 11/23/2022 12:11 AM    WBC 13.6 (H) 11/23/2022 12:11 AM    HCT 43.6 11/23/2022 12:11 AM    HGB 15.0 11/23/2022 12:11 AM    PLATELET 060 54/98/6460 12:11 AM       Imaging:  Reviewed       IMPRESSION/RECOMMENDATIONS:  Sid Hensley. is a 42 y.o. male who presents with bilateral foot weakness of acute onset occurring 2 days ago. MRI lumbar spine remarkable for desiccation of the disc with small bulge and annular tear with minimal narrowing of the canal at L4-L5 along with slight narrowing of the right foraminal canal at L5-S1. Neurological exam suggestive of multiple nerves involved including both tibial and common peroneal nerves as the patient has difficulty with dorsiflexion and plantar flexion due to pain-limiting weakness. He also has diminished sensation to all modalities distal to the ankles along with significant pain and myofascial tightness/edema concerning for possible prolonged immobilization and ischemic compression or crush injury of the muscle causing myositis. Also toxins such as opioid overdose and cocaine may lead to rhabdomyolysis subsequently leading to myositis. Recommendations:  - Would recommend imaging with MRI of the lower extremities including foot and ankle bilaterally to determine etiology of pain and swelling and to rule out compartment syndrome or ischemic vascular injury  - EMG/NCS would be too early at this time window, would recommend performing the study approximately 2 weeks after injury to assess for any demyelination.  - Pain management per primary team  - Physical therapy  -Continue to manage rhabdo per primary team    Thank you very much for this consultation. Neurology will continue to follow.       I spent 40 minutes providing care to this acutely ill inpatient with > 50% of the time counseling and assisting in the coordination of care of the patient on the patient's hospital floor/unit.      Tuan Campos DO

## 2022-11-24 NOTE — PROGRESS NOTES
42 y.o. pleasant disheveled male in significant amount of pain in bilateral lower extremities. PRN oxycodone given. A&OX4.  + pulses, no edema, lungs clear on room air. NSR on telemetry. 20 g to L forearm - normal saline running at 125 mL/hr. Tolerating a regular diet. Using urinal for voiding - frequent urination. Patient unable to flex or extend at the ankle but is able to slightly lift his legs up off the bed. Patient has abrasions throughout his body, more so on his toes and hands. Dry, cracked skin. Critical Troponin of 328. Trending down from 528.   Notified Shireen Falcon MD.

## 2022-11-24 NOTE — PROGRESS NOTES
Hospitalist Progress Note    NAME: Keena Masters. :  1985   MRN:  [id number]     Admit date: 2022    Today's date: 22    PCP: None      Anticipated discharge date:     Barriers:      Assessment / Plan:    Rhabdomyolysis POA CPK 11,686 --> 5431  ? Myositis or rhabdomyolysis related lumbar paraspinous muscles changes POA  Elevated HS troponin 528 --> 328 POA suspect related to rhabdomyolysis  Passed out yesterday sleeping on concrete surface, woke in afternoon   Smoking/nasal cocaine and opiates   Numbness in feet and inability to move feet bilaterally  Aches in upper thighs and naveed mid back   MRIs with no evidence of diskitis or osteomyelitis  Leukocytosis but no SIRS criteria at admit  Creatinine mildly increased 1.4 improved to 0.83   Evidence of myoglobinuria on UA   No CP  EKG NSR , rate 87, normal axis, no LVH              Incomplete right BBB              Non specific ST-T changes  Continue IVF for 1-2 more days   Serial labs  ASA, hold statin with rhabdomyolysis  No B-blocker with cocaine use  Hold ACE In   Check echo  Procalcitonin  4.38, ? Skin and soft tissue vs myositis     CXR with Edema or atypical pneumonia POA  Significance unclear  No cough or SOB  Remains on RA  Rapid COVID-19 negative  Influenza A/b antigen negative  Respiratory PCR negative including influenza and COVID-19  pBNP 778  Check echo     Inability to move feet bilaterally with numbness POA  Bilateral foot pain R > L  Onset day prior to admit, after passing out on concrete floor  Able to lift legs, but cannot dorsiflex or plantar flex feet, no improvementt  Calf and thigh not tender or firm   No HA  DP and PT pulses remains 2+ and symmetric  CTA abdomen with LE runoff IMPRESSION  1.  6.3 x 4.5 cm enhancing left retroperitoneal mass/masses, likely adrenal in  origin. Further evaluation with adrenal mass protocol CT/MRI recommended. Lesion  is amenable to percutaneous biopsy.    2. Patent bilateral inflow and outflow vessels. 3.  Normal three-vessel runoff bilaterally. MRI C-spine               Multilevel disc and facet degenerative change with congenital narrowing of the                   cervical canal.              There is no evidence of epidural abscess. There is moderate canal and severe right foraminal stenosis at C3-4. MRI thoracic spine   No evidence of epidural abscess. Normal MRI evaluation of the thoracic spine. MRI lumbar spine IMPRESSION  1. Large left adrenal mass incompletely evaluated either representing primary  adrenal tumor or metastatic disease. Pheochromocytoma is occluded. Laboratory  evaluation is recommended including CT of the chest abdomen pelvis to search for  primary  2. Patchy abnormal dorsal paraspinous muscle signal with confluent areas of  postcontrast enhancement. Possible etiologies include an infectious or  inflammatory etiology/myositis without drainable fluid collection. Tumor  infiltration cannot be excluded  3. A discrete epidural collection is not identified. No evidence of discitis  Etiology unclear  ? Bilateral calf compartment syndrome naveed with the rhabdomyolysis              Seems unlikely, spoke with orthopedics, no evidence of this at present              No evidence of spinal cord process                          Seen by NSGY in ED              Circulation intact              ? Brain imaging of any value              ? Peripheral nerve injury while immobile  Appreciate neurology input as well  Check B12 and TSH  Hopefully will improve with time  PRN pain meds  PT/OT consults    Low Phosphorus  PO neutraphos  Serial labs     ?  Left adrenal mass 6.3 x 4.5 cm enhancing POA  Noted incidentally on CTA scans and lumbar MRI  Left retroperitoneal mass/masses, likely adrenal in origin  Check metanephrines  May need percutanous biopdy once acute issues resolved  Presence of mass discussed with patient     Essential HTN POA  PO norvasc  PRN hydralazine     Drug screen + for cocaine and opiates  Uses intranasal, denies IVDA     Tobacco use < 1 PPD  Requests nicotine patch     Overweight POA Body mass index is 26.29 kg/m². Given the patient's current clinical presentation, I have a high level of concern for decompensation if discharged from the emergency department. My assessment of this patient's clinical condition and my plan of care is as noted above. DVT prophylaxis with Lovenox     Code status: Full code  NOK:    Subjective:     Chief Complaint / Reason for Physician Visit  \"I feel like I am detoxing\". Discussed with RN events overnight. Not able to sleep, left leg feels worse, like things are spreading up the leg  Still not able to move his feet  No CP or SOB  Denies ETOH use    Review of Systems:  Symptom Y/N Comments  Symptom Y/N Comments   Fever/Chills n   Chest Pain n    Poor Appetite    Edema     Cough n   Abdominal Pain n    Sputum    Joint Pain Y    SOB/HIGUERA n   Headache y    Nausea/vomit n   Tolerating PT/OT     Diarrhea n   Tolerating Diet y    Constipation    Other       Could NOT obtain due to:      Objective:     VITALS:   Last 24hrs VS reviewed since prior progress note.  Most recent are:  Patient Vitals for the past 24 hrs:   Temp Pulse Resp BP SpO2   11/24/22 0749 98.2 °F (36.8 °C) 93 18 (!) 164/83 99 %   11/24/22 0658 -- 89 20 (!) 166/89 100 %   11/24/22 0547 98.7 °F (37.1 °C) 86 20 (!) 191/105 99 %   11/23/22 2025 -- -- -- (!) 169/94 --   11/23/22 1947 98.8 °F (37.1 °C) 80 17 (!) 182/97 99 %   11/23/22 1703 -- 83 -- (!) 163/91 --       Intake/Output Summary (Last 24 hours) at 11/24/2022 0809  Last data filed at 11/24/2022 0554  Gross per 24 hour   Intake --   Output 5875 ml   Net -5875 ml        Wt Readings from Last 12 Encounters:   11/22/22 80.7 kg (178 lb)   06/12/18 72 kg (158 lb 11.7 oz)   05/30/17 72.6 kg (160 lb)   04/22/16 75.8 kg (167 lb 2 oz)   12/10/13 75.5 kg (166 lb 6.4 oz)   07/31/13 69 kg (152 lb 1.9 oz)   12/26/12 71.7 kg (158 lb)   07/30/12 72.6 kg (160 lb)   07/09/12 72.6 kg (160 lb)   05/27/12 73.8 kg (162 lb 12.8 oz)   03/21/12 72 kg (158 lb 11.7 oz)   02/06/12 73.7 kg (162 lb 7.7 oz)       PHYSICAL EXAM:    I had a face to face encounter and independently examined this patient on 11/24/22 as outlined below:    General: WD, WN. Alert, uncomfortable appearing, no acute distress    EENT:  PERRL. Anicteric sclerae. MMM  Resp:  CTA bilaterally, no wheezing or rales. No accessory muscle use  CV:  Regular  rhythm,  No edema  GI:  Soft, Non distended, Non tender. +Bowel sounds, no rebound  MS:  Calves soft, not firm  Neurologic:  Alert and oriented X 3, normal speech, non focal motor exam  Psych:   Not anxious nor agitated  Skin:  Abrasions on hands with dry skin, abrasions on dorsal toes bilaterally    Reviewed most current lab test results and cultures  YES  Reviewed most current radiology test results   YES  Review and summation of old records today    NO  Reviewed patient's current orders and MAR    YES  PMH/ reviewed - no change compared to H&P  ________________________________________________________________________  Care Plan discussed with:    Comments   Patient x    Family      RN x    Care Manager     Consultant                        Multidiciplinary team rounds were held today with , nursing, pharmacist and clinical coordinator. Patient's plan of care was discussed; medications were reviewed and discharge planning was addressed. ________________________________________________________________________      Comments   >50% of visit spent in counseling and coordination of care     ________________________________________________________________________  Armando Angelucci, MD     Procedures: see electronic medical records for all procedures/Xrays and details which were not copied into this note but were reviewed prior to creation of Plan.       LABS:  I reviewed today's most current labs and imaging studies.   Pertinent labs include:  Recent Labs     11/24/22 0019 11/23/22 0011   WBC 9.7 13.6*   HGB 13.7 15.0   HCT 39.4 43.6    341     Recent Labs     11/24/22 0019 11/23/22 0011     137 134*   K 4.1  4.1 4.4     104 100   CO2 29  29 25   *  156* 87   BUN 18  18 30*   CREA 0.84  0.83 1.40*   CA 9.1  9.2 8.7   PHOS 1.3*  --    ALB 3.3*  3.3* 3.8   TBILI 0.4 0.6   * 148*

## 2022-11-24 NOTE — PROGRESS NOTES
Renal function better, stable UOP, CPK improving  Cont IVF  Daily labs  Call us if needed      Kristin Hernandez MD  [facility]   [address] [address] [facility]  Phone - [phone]   Fax - [phone]  [email or web address]

## 2022-11-25 LAB
ALBUMIN SERPL-MCNC: 3.5 G/DL (ref 3.5–5)
ANION GAP SERPL CALC-SCNC: 5 MMOL/L (ref 5–15)
BASOPHILS # BLD: 0.1 K/UL (ref 0–0.1)
BASOPHILS NFR BLD: 1 % (ref 0–1)
BUN SERPL-MCNC: 14 MG/DL (ref 6–20)
BUN/CREAT SERPL: 23 (ref 12–20)
CALCIUM SERPL-MCNC: 9.8 MG/DL (ref 8.5–10.1)
CHLORIDE SERPL-SCNC: 108 MMOL/L (ref 97–108)
CK SERPL-CCNC: 3349 U/L (ref 39–308)
CO2 SERPL-SCNC: 22 MMOL/L (ref 21–32)
CREAT SERPL-MCNC: 0.62 MG/DL (ref 0.7–1.3)
DIFFERENTIAL METHOD BLD: ABNORMAL
EOSINOPHIL # BLD: 0.2 K/UL (ref 0–0.4)
EOSINOPHIL NFR BLD: 2 % (ref 0–7)
ERYTHROCYTE [DISTWIDTH] IN BLOOD BY AUTOMATED COUNT: 14.3 % (ref 11.5–14.5)
GLUCOSE SERPL-MCNC: 126 MG/DL (ref 65–100)
HCT VFR BLD AUTO: 43.2 % (ref 36.6–50.3)
HGB BLD-MCNC: 14.6 G/DL (ref 12.1–17)
IMM GRANULOCYTES # BLD AUTO: 0 K/UL (ref 0–0.04)
IMM GRANULOCYTES NFR BLD AUTO: 0 % (ref 0–0.5)
LYMPHOCYTES # BLD: 3.6 K/UL (ref 0.8–3.5)
LYMPHOCYTES NFR BLD: 33 % (ref 12–49)
MAGNESIUM SERPL-MCNC: 1.8 MG/DL (ref 1.6–2.4)
MCH RBC QN AUTO: 30.8 PG (ref 26–34)
MCHC RBC AUTO-ENTMCNC: 33.8 G/DL (ref 30–36.5)
MCV RBC AUTO: 91.1 FL (ref 80–99)
MONOCYTES # BLD: 1 K/UL (ref 0–1)
MONOCYTES NFR BLD: 9 % (ref 5–13)
NEUTS SEG # BLD: 6.1 K/UL (ref 1.8–8)
NEUTS SEG NFR BLD: 55 % (ref 32–75)
NRBC # BLD: 0 K/UL (ref 0–0.01)
NRBC BLD-RTO: 0 PER 100 WBC
PHOSPHATE SERPL-MCNC: 3 MG/DL (ref 2.6–4.7)
PLATELET # BLD AUTO: 372 K/UL (ref 150–400)
PMV BLD AUTO: 9.9 FL (ref 8.9–12.9)
POTASSIUM SERPL-SCNC: 4.6 MMOL/L (ref 3.5–5.1)
RBC # BLD AUTO: 4.74 M/UL (ref 4.1–5.7)
SODIUM SERPL-SCNC: 135 MMOL/L (ref 136–145)
WBC # BLD AUTO: 11.1 K/UL (ref 4.1–11.1)

## 2022-11-25 PROCEDURE — 85025 COMPLETE CBC W/AUTO DIFF WBC: CPT

## 2022-11-25 PROCEDURE — 74011250637 HC RX REV CODE- 250/637: Performed by: NURSE PRACTITIONER

## 2022-11-25 PROCEDURE — 74011250636 HC RX REV CODE- 250/636: Performed by: INTERNAL MEDICINE

## 2022-11-25 PROCEDURE — 97161 PT EVAL LOW COMPLEX 20 MIN: CPT

## 2022-11-25 PROCEDURE — 82550 ASSAY OF CK (CPK): CPT

## 2022-11-25 PROCEDURE — 80069 RENAL FUNCTION PANEL: CPT

## 2022-11-25 PROCEDURE — 83735 ASSAY OF MAGNESIUM: CPT

## 2022-11-25 PROCEDURE — 97165 OT EVAL LOW COMPLEX 30 MIN: CPT

## 2022-11-25 PROCEDURE — 65270000046 HC RM TELEMETRY

## 2022-11-25 PROCEDURE — 97530 THERAPEUTIC ACTIVITIES: CPT

## 2022-11-25 PROCEDURE — 97535 SELF CARE MNGMENT TRAINING: CPT

## 2022-11-25 PROCEDURE — 74011250637 HC RX REV CODE- 250/637: Performed by: INTERNAL MEDICINE

## 2022-11-25 PROCEDURE — 76937 US GUIDE VASCULAR ACCESS: CPT

## 2022-11-25 PROCEDURE — 74011000250 HC RX REV CODE- 250: Performed by: INTERNAL MEDICINE

## 2022-11-25 PROCEDURE — 74011000258 HC RX REV CODE- 258: Performed by: INTERNAL MEDICINE

## 2022-11-25 PROCEDURE — 97116 GAIT TRAINING THERAPY: CPT

## 2022-11-25 PROCEDURE — 36415 COLL VENOUS BLD VENIPUNCTURE: CPT

## 2022-11-25 RX ORDER — AMLODIPINE BESYLATE 5 MG/1
10 TABLET ORAL DAILY
Status: DISCONTINUED | OUTPATIENT
Start: 2022-11-26 | End: 2022-11-30

## 2022-11-25 RX ORDER — AMLODIPINE BESYLATE 5 MG/1
5 TABLET ORAL
Status: COMPLETED | OUTPATIENT
Start: 2022-11-25 | End: 2022-11-25

## 2022-11-25 RX ADMIN — ASPIRIN 81 MG: 81 TABLET, CHEWABLE ORAL at 08:14

## 2022-11-25 RX ADMIN — SODIUM CHLORIDE, PRESERVATIVE FREE 10 ML: 5 INJECTION INTRAVENOUS at 05:37

## 2022-11-25 RX ADMIN — VANCOMYCIN HYDROCHLORIDE 1000 MG: 1 INJECTION, POWDER, LYOPHILIZED, FOR SOLUTION INTRAVENOUS at 09:56

## 2022-11-25 RX ADMIN — OXYCODONE 5 MG: 5 TABLET ORAL at 23:35

## 2022-11-25 RX ADMIN — POTASSIUM & SODIUM PHOSPHATES POWDER PACK 280-160-250 MG 1 PACKET: 280-160-250 PACK at 13:14

## 2022-11-25 RX ADMIN — CEFTRIAXONE 1 G: 1 INJECTION, POWDER, FOR SOLUTION INTRAMUSCULAR; INTRAVENOUS at 08:16

## 2022-11-25 RX ADMIN — MORPHINE SULFATE 2 MG: 2 INJECTION, SOLUTION INTRAMUSCULAR; INTRAVENOUS at 00:19

## 2022-11-25 RX ADMIN — MORPHINE SULFATE 2 MG: 2 INJECTION, SOLUTION INTRAMUSCULAR; INTRAVENOUS at 08:08

## 2022-11-25 RX ADMIN — MORPHINE SULFATE 2 MG: 2 INJECTION, SOLUTION INTRAMUSCULAR; INTRAVENOUS at 21:27

## 2022-11-25 RX ADMIN — SODIUM CHLORIDE, PRESERVATIVE FREE 10 ML: 5 INJECTION INTRAVENOUS at 16:17

## 2022-11-25 RX ADMIN — MORPHINE SULFATE 2 MG: 2 INJECTION, SOLUTION INTRAMUSCULAR; INTRAVENOUS at 13:13

## 2022-11-25 RX ADMIN — VANCOMYCIN HYDROCHLORIDE 1000 MG: 1 INJECTION, POWDER, LYOPHILIZED, FOR SOLUTION INTRAVENOUS at 21:26

## 2022-11-25 RX ADMIN — OXYCODONE 5 MG: 5 TABLET ORAL at 16:41

## 2022-11-25 RX ADMIN — OXYCODONE 5 MG: 5 TABLET ORAL at 11:03

## 2022-11-25 RX ADMIN — SODIUM CHLORIDE, PRESERVATIVE FREE 10 ML: 5 INJECTION INTRAVENOUS at 21:26

## 2022-11-25 RX ADMIN — OXYCODONE 5 MG: 5 TABLET ORAL at 04:03

## 2022-11-25 RX ADMIN — MORPHINE SULFATE 2 MG: 2 INJECTION, SOLUTION INTRAMUSCULAR; INTRAVENOUS at 17:44

## 2022-11-25 RX ADMIN — THIAMINE HCL TAB 100 MG 200 MG: 100 TAB at 08:14

## 2022-11-25 RX ADMIN — MELATONIN 3 MG: at 21:27

## 2022-11-25 RX ADMIN — AMLODIPINE BESYLATE 5 MG: 5 TABLET ORAL at 16:11

## 2022-11-25 RX ADMIN — AMLODIPINE BESYLATE 5 MG: 5 TABLET ORAL at 08:15

## 2022-11-25 RX ADMIN — POTASSIUM & SODIUM PHOSPHATES POWDER PACK 280-160-250 MG 1 PACKET: 280-160-250 PACK at 08:13

## 2022-11-25 NOTE — PROGRESS NOTES
TRANSFER - OUT REPORT:    Verbal report given to Aminta(name) on Laraine Barthel.  being transferred to Ortho(unit) for routine progression of care       Report consisted of patients Situation, Background, Assessment and   Recommendations(SBAR). Information from the following report(s) SBAR, Kardex, Intake/Output, MAR, and Recent Results was reviewed with the receiving nurse. Lines:   Extended Dwell Peripheral IV (Active)   Criteria for Appropriate Use Limited/no vessel suitable for conventional peripheral access 11/25/22 1601   Site Assessment Clean, dry, & intact 11/25/22 1601   Phlebitis Assessment 0 11/25/22 1601   Infiltration Assessment 0 11/25/22 1601   External Catheter Length (cm) 0 centimeters 11/25/22 1601   Line Status Blood return noted 11/25/22 1601   Alcohol Cap Used Yes 11/25/22 1601   Date of Last Dressing Change 11/25/22 11/25/22 1601   Dressing Type Transparent 11/25/22 1601   Dressing Status Clean, dry, & intact;New;Occlusive 11/25/22 1601   Dressing Intervention New dressing 11/25/22 1601        Opportunity for questions and clarification was provided.

## 2022-11-25 NOTE — PROGRESS NOTES
Problem: Falls - Risk of  Goal: *Absence of Falls  Description: Document Maki Barrientos Fall Risk and appropriate interventions in the flowsheet.   Outcome: Progressing Towards Goal  Note: Fall Risk Interventions:  Mobility Interventions: Bed/chair exit alarm         Medication Interventions: Bed/chair exit alarm    Elimination Interventions: Call light in reach              Problem: Patient Education: Go to Patient Education Activity  Goal: Patient/Family Education  Outcome: Progressing Towards Goal     Problem: General Medical Care Plan  Goal: *Vital signs within specified parameters  Outcome: Progressing Towards Goal  Goal: *Labs within defined limits  Outcome: Progressing Towards Goal  Goal: *Optimal pain control at patient's stated goal  Outcome: Progressing Towards Goal  Goal: *Skin integrity maintained  Outcome: Progressing Towards Goal  Goal: *Anxiety reduced or absent  Outcome: Progressing Towards Goal     Problem: General Medical Care Plan  Goal: *Vital signs within specified parameters  Outcome: Progressing Towards Goal  Goal: *Labs within defined limits  Outcome: Progressing Towards Goal  Goal: *Optimal pain control at patient's stated goal  Outcome: Progressing Towards Goal  Goal: *Skin integrity maintained  Outcome: Progressing Towards Goal  Goal: *Anxiety reduced or absent  Outcome: Progressing Towards Goal     Problem: Patient Education: Go to Patient Education Activity  Goal: Patient/Family Education  Outcome: Progressing Towards Goal

## 2022-11-25 NOTE — PROGRESS NOTES
OCCUPATIONAL THERAPY EVALUATION/DISCHARGE  Patient: Ryan Díaz. (41 y.o. male)  Date: 11/25/2022  Primary Diagnosis: Rhabdomyolysis [M62.82]       Precautions:        ASSESSMENT  Based on the objective data described below, the patient presents with increased pain in BLE with more increased pain in his feet. Pt reports that his feet ar numb. Left worse than the right. Pt is homeless and per chart was using cocaine and slept in an abandon  basement and when he woke up he could not move his legs and crawled out to call for 911. Pt was supine in bed and bed allow OT to demarcus sock on right foot but not on left foot. He has good range and strength in BUE with numerous open cuts on right hand. Pt was independent for bed mobility, and able to stand with min to CGA and with use of RW, and able to walk to bathroom and CGA to transfer to and from toilet with SBA. Pt was SBA to transfer to chair, and left siting up in chair and at this time feel that pt will be back to his independent living but unsure where pt will be discharged. Pt has no OT needs. Current Level of Function (ADLs/self-care): set up    Functional Outcome Measure: The patient scored 70/100 on the Barthel outcome measure which is indicative of setup to SBA. Other factors to consider for discharge: pt is homeless , PT to follow     PLAN :  Recommend with staff: [address] [place] [address] for meals and walk to bathroom     Recommendation for discharge: (in order for the patient to meet his/her long term goals)  No skilled occupational therapy/ follow up rehabilitation needs identified at this time. This discharge recommendation:  Has been made in collaboration with the attending provider and/or case management    IF patient discharges home will need the following DME: tbd       SUBJECTIVE:   Patient stated I can move but my feet are still numb and painful.     OBJECTIVE DATA SUMMARY:   HISTORY:   Past Medical History:   Diagnosis Date    Hypertension Past Surgical History:   Procedure Laterality Date    NEUROLOGICAL PROCEDURE UNLISTED      injection for a pinched nerve       Prior Level of Function/Environment/Context: pt is homeless  Expanded or extensive additional review of patient history:   Home Situation  Home Environment:  (noted in chart to be homeless, full situation unclear)  Living Alone: Yes  Patient Expects to be Discharged to[de-identified] Home    Hand dominance: Right    EXAMINATION OF PERFORMANCE DEFICITS:  Cognitive/Behavioral Status:  Neurologic State: Alert  Orientation Level: Oriented X4  Cognition: Follows commands  Perception: Appears intact  Perseveration: No perseveration noted  Safety/Judgement: Awareness of environment    Skin: in fair health     Edema: none    Hearing: Auditory  Auditory Impairment: None    Vision/Perceptual:                    intact                 Range of Motion:    AROM: Within functional limits (for UEs; decreased B ankles L>R)  PROM: Within functional limits                      Strength:    Strength: Within functional limits (WFL UEs; prox LEs good but ankles decreased L>R: L dorsiflexion 2+/5 and R 3- to 3/5 both limited by pain; plantar flexion decreased but not as severely)                Coordination:  Coordination: Within functional limits  Fine Motor Skills-Upper: Left Intact; Right Intact    Gross Motor Skills-Upper: Left Intact; Right Intact    Tone & Sensation:    Tone: Normal  Sensation: Intact (hypersensitive at feet but states feels numb on L)                      Balance:  Sitting: Intact  Standing: Impaired; Without support  Standing - Static: Good;Constant support  Standing - Dynamic : Fair;Good;Constant support (RW)    Functional Mobility and Transfers for ADLs:  Bed Mobility:  Rolling: Independent  Supine to Sit: Independent  Sit to Supine: Independent  Scooting: Independent    Transfers:  Sit to Stand: Contact guard assistance;Minimum assistance;Assist x1  Stand to Sit: Contact guard assistance;Assist x1  Bed to Chair: Contact guard assistance;Stand-by assistance;Assist x1 (pt got better as he moved)  Bathroom Mobility: Stand-by assistance  Toilet Transfer : Independent    ADL Assessment:  Feeding: Independent    Oral Facial Hygiene/Grooming: Independent    Bathing: Stand-by assistance    Type of Bath: Chlorhexidine (CHG)    Upper Body Dressing: Independent    Lower Body Dressing: Independent    Toileting: Independent              Type of Bath: Chlorhexidine (CHG)         Cognitive Retraining  Safety/Judgement: Awareness of environment       Functional Measure:    Barthel Index:  Bathin  Bladder: 10  Bowels: 10  Groomin  Dressin  Feeding: 10  Mobility: 10  Stairs: 0  Toilet Use: 5  Transfer (Bed to Chair and Back): 10  Total: 70/100      The Barthel ADL Index: Guidelines  1. The index should be used as a record of what a patient does, not as a record of what a patient could do. 2. The main aim is to establish degree of independence from any help, physical or verbal, however minor and for whatever reason. 3. The need for supervision renders the patient not independent. 4. A patient's performance should be established using the best available evidence. Asking the patient, friends/relatives and nurses are the usual sources, but direct observation and common sense are also important. However direct testing is not needed. 5. Usually the patient's performance over the preceding 24-48 hours is important, but occasionally longer periods will be relevant. 6. Middle categories imply that the patient supplies over 50 per cent of the effort. 7. Use of aids to be independent is allowed. Score Interpretation (from [address] [address])    Independent   60-79 Minimally independent   40-59 Partially dependent   20-39 Very dependent   <20 Totally dependent     -Becki Garcia., Barthel, D.W. (1965). Functional evaluation: the Barthel Index. [facility] ([address] (1997).  The Barthel activities of daily living index: self-reporting versus actual performance in the old (> or = 75 years). Journal of [address] [place] [address] 457), 14 [facility], NIC.F, Lew Dwyer., Zackary Priest. (1999). Measuring the change in disability after inpatient rehabilitation; comparison of the responsiveness of the Barthel Index and Functional [place] Measure. Journal of Neurology, Neurosurgery, and Psychiatry, 66(4), 133-497. ELIZABETH Gregg, GRICELDA Porter, & Anne Javed M.A. (2004) Assessment of post-stroke quality of life in cost-effectiveness studies: The usefulness of the Barthel Index and the EuroQoL-5D. Quality of Life Research, 15, 165-14         Occupational Therapy Evaluation Charge Determination   History Examination Decision-Making   LOW Complexity : Brief history review  LOW Complexity : 1-3 performance deficits relating to physical, cognitive , or psychosocial skils that result in activity limitations and / or participation restrictions  LOW Complexity : No comorbidities that affect functional and no verbal or physical assistance needed to complete eval tasks       Based on the above components, the patient evaluation is determined to be of the following complexity level: LOW   Pain Rating:  Pt rated pain as 9 on left foot     Activity Tolerance:   Good    After treatment patient left in no apparent distress:    Sitting in chair and Call bell within reach    COMMUNICATION/EDUCATION:   The patients plan of care was discussed with: Physical therapist and Registered nurse.      Thank you for this referral.  Ben Chakraborty OT  Time Calculation: 21 mins

## 2022-11-25 NOTE — PROGRESS NOTES
Endurance Catheter insertion note     Inserted 20 G, 8 cm long  Endurance Extended Dwell Peripheral Catheter into right upper arm using ultrasound guidance and sterile technique. Positive blood return. Patient tolerated procedure well. Denies questions or concerns at this time. The Endurance catheter is an extended dwell peripheral catheter and may remain in place 29 days. Blood samples can be obtained from this catheter. To obtain labs, a tourniquet may be used, flush with 10ml NS, waste 3ml, draw labs, flush with 20ml NS. Dressings needs to be changed with central line dressing kit using bio patch every 7 days by nurse. Primary nurse        RN notified.         [facility] Vascular Access Team. PICC Nurse

## 2022-11-25 NOTE — PROGRESS NOTES
HARPAL resolved  Signing off case  Call us back with any issues      Nghia Be MD  [facility]   [address]  Phone - [phone]   Fax - [phone]  www. [email or web address]

## 2022-11-25 NOTE — PROGRESS NOTES
adEnd of Shift Note    Bedside shift change report given to [facility] (oncoming nurse) by Louisa Matthews RN (offgoing nurse). Report included the following information SBAR, Kardex, MAR, and Recent Results    Shift worked:  7p-7a     Shift summary and any significant changes:     Pt up alert stated has trouble sleeping. Medicated for pain x 1 tolerated well. HOB up call bell in reach. Concerns for physician to address:  none     Zone phone for oncoming shift:   [phone]       Activity:  Activity Level: Bed Rest  Number times ambulated in hallways past shift: 0  Number of times OOB to chair past shift: 0    Cardiac:   Cardiac Monitoring: No           Access:  Current line(s): PIV     Genitourinary:   Urinary status: voiding    Respiratory:   O2 Device: None (Room air)  Chronic home O2 use?: NO  Incentive spirometer at bedside: NO       GI:  Last Bowel Movement Date: 11/21/22  Current diet:  ADULT DIET Regular  Passing flatus: YES  Tolerating current diet: YES       Pain Management:   Patient states pain is manageable on current regimen: YES    Skin:  Rocael Score: 19  Interventions: float heels and increase time out of bed    Patient Safety:  Fall Score:  Total Score: 3  Interventions: bed/chair alarm  High Fall Risk: Yes    Length of Stay:  Expected LOS: 3d 2h  Actual LOS: 2      Louisa Matthews RN

## 2022-11-25 NOTE — PROGRESS NOTES
Transition of Care Plan  RUR: 6%   DISPOSITION: The disposition plan is pending medical progression  Please note assessment completed using chart notes as patient is unable to reach  F/U with PCP/Specialist    Transport: medicaid cab vs tbd        Reason for Admission:  rhabdomyolysis                      RUR Score:    6%                 Plan for utilizing home health:     TBD     PCP: First and Last name:  None     Name of Practice:    Are you a current patient: Yes/No:    Approximate date of last visit:    Can you participate in a virtual visit with your PCP:                     Current Advanced Directive/Advance Care Plan: Full Code      Healthcare Decision Maker:   Click here to complete [address] including selection of the Healthcare Decision Maker Relationship (ie \"Primary\")                             Transition of Care Plan:                      Patient may currently be homeless as this time. Patient at baseline is independent in caring for himself. Disposition plan is pending. Care Management Interventions  PCP Verified by CM: No (no pcp)  Palliative Care Criteria Met (RRAT>21 & CHF Dx)?: No  Mode of Transport at Discharge:  Other (see comment) (medicaid cab vs tbd)  Transition of Care Consult (CM Consult): Discharge Planning  MyChart Signup: No  Discharge Durable Medical Equipment: No  Health Maintenance Reviewed: Yes  Physical Therapy Consult: Yes  Occupational Therapy Consult: Yes  Speech Therapy Consult: No  Support Systems: Other (Comment)  Confirm Follow Up Transport: Kashif Montero [address] Provided?: No  Discharge Location  Patient Expects to be Discharged to[de-identified] Homeless    1:58 PM  Demarco Ashraf, [address] [place] [address] Avenue

## 2022-11-25 NOTE — PROGRESS NOTES
adEnd of Shift Note    Bedside shift change report given to Efren Kaplan RN (oncoming nurse) by Jimmie Michele LPN (offgoing nurse). Report included the following information SBAR, Kardex, and MAR    Shift worked:  7p-7:30am     Shift summary and any significant changes:     Pt is A/Ox4. Pt is complaining of constant pain in lower extremities and back. Pt complain of pain, PRN pain med administered (see MAR).  Pt rounded on, labs drawn, pt watching tv quietly    Concerns for physician to address:       Zone phone for oncoming shift:          Jimmie Michele LPN

## 2022-11-25 NOTE — PROGRESS NOTES
Problem: Mobility Impaired (Adult and Pediatric)  Goal: *Acute Goals and Plan of Care (Insert Text)  Description: FUNCTIONAL STATUS PRIOR TO ADMISSION: pt was previously independent without device; unclear of full social hx    HOME SUPPORT PRIOR TO ADMISSION: ?homeless per hospitalist note; not fully clear    Physical Therapy Goals  Initiated 11/25/2022  1. Patient will transfer from bed to chair and chair to bed with modified independence using the least restrictive device within 7 day(s). 2.  Patient will perform sit to stand with modified independence within 7 day(s). 3.  Patient will ambulate with modified independence for 150 feet with the least restrictive device within 7 day(s). 4.  Patient will ascend/descend 12 stairs with handrail(s) with modified independence within 7 day(s). Outcome: Not Met   PHYSICAL THERAPY EVALUATION  Patient: Francois López (41 y.o. male)  Date: 11/25/2022  Primary Diagnosis: Rhabdomyolysis [M62.82]       Precautions: fall      ASSESSMENT  Based on the objective data described below, the patient presents with scabbed and open areas top of B feet; hypersensitivity to touch on feet but states otherwise feels numb LLE>RLE. He shows decreased strength at B ankles but has improved since admission now being able to move R more effectively through dorsiflexion to near neutral but L still moderately limited by pain. He states the pain in the rest of his legs has decreased since adm. He shows deficits resulting from this in transfers and gait and takes limited WB onto the LLE with use of the walker. Overall CGA for transfers and amb to and from bathroom with the walker. Pt would benefit from trial with use of crutches to improve his overall mobility and allow him to accomplish stairs. If he would need to go to medical respite (unsure of situation) he may have to do stairs.  Feel pt not yet fully independent due to the pain and weakness in distal LEs but has improved each day.     Current Level of Function Impacting Discharge (mobility/balance): CGA with use of walker; will benefit from training with crutches as tolerated    Functional Outcome Measure: The patient scored 70/100 on the barthel outcome measure which is indicative of 30% functional impairment. Other factors to consider for discharge: per hospitalist note pt is homeless and stays with different people, basements     Patient will benefit from skilled therapy intervention to address the above noted impairments. PLAN :  Recommendations and Planned Interventions: transfer training, gait training, therapeutic exercises, patient and family training/education, and therapeutic activities      Frequency/Duration: Patient will be followed by physical therapy:  4 times a week to address goals. Recommendation for discharge: (in order for the patient to meet his/her long term goals)  To be determined: unsure of disposition possibilities; at this point would need some help but may continue to improve as he has more movement and tolerance today than at admission    This discharge recommendation:  Has been made in collaboration with the attending provider and/or case management    IF patient discharges home will need the following DME: to be determined (TBD)         SUBJECTIVE:   Patient stated My (upper) legs are a little better.     OBJECTIVE DATA SUMMARY:   HISTORY:    Past Medical History:   Diagnosis Date    Hypertension      Past Surgical History:   Procedure Laterality Date    NEUROLOGICAL PROCEDURE UNLISTED      injection for a pinched nerve       Personal factors and/or comorbidities impacting plan of care:     Home Situation  Home Environment:  (noted in chart to be homeless, full situation unclear)  Living Alone: Yes  Patient Expects to be Discharged to[de-identified] Home    EXAMINATION/PRESENTATION/DECISION MAKING:   Critical Behavior:  Neurologic State: Alert  Orientation Level: Oriented X4  Cognition: Follows commands  Safety/Judgement: Awareness of environment  Hearing: Auditory  Auditory Impairment: None    Range Of Motion:  AROM: Within functional limits (for UEs; decreased B ankles L>R)           PROM: Within functional limits           Strength:    Strength: Within functional limits (WFL UEs; prox LEs good but ankles decreased L>R: L dorsiflexion 2+/5 and R 3- to 3/5 both limited by pain; plantar flexion decreased but not as severely)                    Tone & Sensation:   Tone: Normal              Sensation: Intact (hypersensitive at feet but states feels numb on L)               Coordination:  Coordination: Within functional limits       Functional Mobility:  Bed Mobility:  Rolling: Independent  Supine to Sit: Independent  Sit to Supine: Independent  Scooting: Independent  Transfers:  Sit to Stand: Contact guard assistance;Minimum assistance;Assist x1  Stand to Sit: Contact guard assistance;Assist x1        Bed to Chair: Contact guard assistance;Stand-by assistance;Assist x1 (pt got better as he moved)              Balance:   Sitting: Intact  Standing: Impaired; Without support  Standing - Static: Good;Constant support  Standing - Dynamic : Fair;Good;Constant support (RW)  Ambulation/Gait Training:  Distance (ft): 40 Feet (ft)  Assistive Device: Gait belt;Walker, rolling  Ambulation - Level of Assistance: Contact guard assistance        Gait Abnormalities: Decreased step clearance; Other (takes very little weight onto LLE)        Base of Support: Shift to right  Stance: Left decreased  Speed/Katelyn: Slow;Pace decreased (<100 feet/min)  Step Length: Right shortened;Left shortened               Functional Measure:  Barthel Index:    Bathin  Bladder: 10  Bowels: 10  Groomin  Dressin  Feeding: 10  Mobility: 10  Stairs: 0  Toilet Use: 5  Transfer (Bed to Chair and Back): 10  Total: 70/100       The Barthel ADL Index: Guidelines  1.  The index should be used as a record of what a patient does, not as a record of what a patient could do. 2. The main aim is to establish degree of independence from any help, physical or verbal, however minor and for whatever reason. 3. The need for supervision renders the patient not independent. 4. A patient's performance should be established using the best available evidence. Asking the patient, friends/relatives and nurses are the usual sources, but direct observation and common sense are also important. However direct testing is not needed. 5. Usually the patient's performance over the preceding 24-48 hours is important, but occasionally longer periods will be relevant. 6. Middle categories imply that the patient supplies over 50 per cent of the effort. 7. Use of aids to be independent is allowed. Score Interpretation (from [facility] 83)    Independent   60-79 Minimally independent   40-59 Partially dependent   20-39 Very dependent   <20 Totally dependent     -Becki Garcia., Barthel, MOLLYW. (1965). Functional evaluation: the Barthel Index. [facility] ([address] (1997). The Barthel activities of daily living index: self-reporting versus actual performance in the old (> or = 75 years). Journal of [address] [place] [address] 45(7), 14 [facility], .AlbaAlbaF, Andressa Henderson., Harjeet Villanuevakner. (1999). Measuring the change in disability after inpatient rehabilitation; comparison of the responsiveness of the Barthel Index and Functional [place] Measure. Journal of Neurology, Neurosurgery, and Psychiatry, 66(4), 647-886. Aurelio Ochoa, N.J.A, Soraida Hart  W.J.POLA, & Jorden Homans, M.A. (2004) Assessment of post-stroke quality of life in cost-effectiveness studies: The usefulness of the Barthel Index and the EuroQoL-5D.  Quality of Life Research, 15, 403-66           Physical Therapy Evaluation Charge Determination   History Examination Presentation Decision-Making   HIGH Complexity :3+ comorbidities / personal factors will impact the outcome/ POC  HIGH Complexity : 4+ Standardized tests and measures addressing body structure, function, activity limitation and / or participation in recreation  MEDIUM Complexity : Evolving with changing characteristics  LOW Complexity : FOTO score of       Based on the above components, the patient evaluation is determined to be of the following complexity level: LOW     Pain Ratin/10 rest naveed L lower leg and foot; 9/10 with WB attempt in amb    Activity Tolerance:   Fair    After treatment patient left in no apparent distress:   Sitting in chair, Heels elevated for pressure relief, Call bell within reach, and Bed / chair alarm activated    COMMUNICATION/EDUCATION:   The patients plan of care was discussed with: Occupational therapist, Registered nurse, and Case management. Fall prevention education was provided and the patient/caregiver indicated understanding., Patient/family have participated as able in goal setting and plan of care. , and Patient/family agree to work toward stated goals and plan of care.     Thank you for this referral.  Pedro Braxton, PT   Time Calculation: 20 mins

## 2022-11-25 NOTE — PROGRESS NOTES
Hospitalist Progress Note    Subjective:   Daily Progress Note: 11/25/2022 2:33 PM    Hospital Course: Patient presented to the emergency room on 11/22/22 after passing out in the basement for an unknown period of time after using multiple drugs. When he came to, he was unable to move either leg and had numbness throughout. Continues to have altered sensation to both feet and ankles right greater than left. Subjective: Patient observed resting in bed with eyes opened. States that numbness in left foot has improved however right foot numbness remains. Denies chest pain, shortness of breath, palpitations, dizziness or distress. No acute distress noted.     Current Facility-Administered Medications   Medication Dose Route Frequency    [START ON 11/26/2022] Vancomycin - trough  1 Each Other ONCE    [START ON 11/26/2022] amLODIPine (NORVASC) tablet 10 mg  10 mg Oral DAILY    amLODIPine (NORVASC) tablet 5 mg  5 mg Oral NOW    cefTRIAXone (ROCEPHIN) 1 g in 0.9% sodium chloride (MBP/ADV) 50 mL MBP  1 g IntraVENous Q24H    morphine injection 2 mg  2 mg IntraVENous Q3H PRN    vancomycin (VANCOCIN) 1,000 mg in 0.9% sodium chloride 250 mL ([id number])  1,000 mg IntraVENous Q12H    aspirin chewable tablet 81 mg  81 mg Oral DAILY    melatonin tablet 3 mg  3 mg Oral QHS    sodium chloride (NS) flush 5-40 mL  5-40 mL IntraVENous Q8H    sodium chloride (NS) flush 5-40 mL  5-40 mL IntraVENous PRN    acetaminophen (TYLENOL) tablet 650 mg  650 mg Oral Q6H PRN    Or    acetaminophen (TYLENOL) suppository 650 mg  650 mg Rectal Q6H PRN    polyethylene glycol (MIRALAX) packet 17 g  17 g Oral DAILY    bisacodyL (DULCOLAX) tablet 5 mg  5 mg Oral DAILY PRN    promethazine (PHENERGAN) tablet 12.5 mg  12.5 mg Oral Q6H PRN    Or    ondansetron (ZOFRAN) injection 4 mg  4 mg IntraVENous Q6H PRN    enoxaparin (LOVENOX) injection 40 mg  40 mg SubCUTAneous DAILY    hydrALAZINE (APRESOLINE) 20 mg/mL injection 20 mg  20 mg IntraVENous Q6H PRN oxyCODONE IR (ROXICODONE) tablet 5 mg  5 mg Oral Q6H PRN    0.9% sodium chloride infusion  125 mL/hr IntraVENous CONTINUOUS    nicotine (NICODERM CQ) 14 mg/24 hr patch 1 Patch  1 Patch TransDERmal DAILY    thiamine mononitrate (B-1) tablet 200 mg  200 mg Oral DAILY        Review of Systems:    Review of Systems   Constitutional: Negative. HENT: Negative. Eyes: Negative. Respiratory: Negative. Cardiovascular: Negative. Gastrointestinal: Negative. Skin: Negative. Neurological:  Positive for weakness. Numbness/tingling to both feet and ankles right greater than left. Endo/Heme/Allergies: Negative. Psychiatric/Behavioral: Negative. Objective:     Visit Vitals  BP (!) 156/102   Pulse 78   Temp 97.5 °F (36.4 °C)   Resp 18   Ht 5' 9\" (1.753 m)   Wt 80.7 kg (178 lb)   SpO2 100%   BMI 26.29 kg/m²      O2 Device: None (Room air)    Temp (24hrs), Av.9 °F (36.6 °C), Min:97.5 °F (36.4 °C), Max:98.2 °F (36.8 °C)      701 - 1900  In: -   Out: 900 [Urine:900]  1901 - 700  In: -   Out: 6150 [XFVWW:3540]    PHYSICAL EXAM:    Physical Exam  Vitals reviewed. HENT:      Head: Normocephalic and atraumatic. Right Ear: External ear normal.      Left Ear: External ear normal.      Nose: Nose normal.      Mouth/Throat:      Pharynx: Oropharynx is clear. Eyes:      Pupils: Pupils are equal, round, and reactive to light. Cardiovascular:      Rate and Rhythm: Normal rate and regular rhythm. Pulses: Normal pulses. Heart sounds: Normal heart sounds. Pulmonary:      Effort: Pulmonary effort is normal.      Breath sounds: Normal breath sounds. Abdominal:      General: Bowel sounds are normal.      Comments: Last reported bowel movement on 22. Musculoskeletal:      Cervical back: Normal range of motion. Comments: Generalized weakness of both feet and ankles. Skin:     General: Skin is warm and dry.       Capillary Refill: Capillary refill takes 2 to 3 seconds. Comments: Mutliple scars to knuckles on both hands  Multiple scars and scratches to anterior surface of both feet. Neurological:      Mental Status: He is alert and oriented to person, place, and time. Comments: Altered sensation to both feet and toes. Psychiatric:         Mood and Affect: Mood normal.          Data Review    Recent Results (from the past 24 hour(s))   CK    Collection Time: 11/25/22  2:12 AM   Result Value Ref Range    CK 3,349 (H) 39 - 308 U/L   RENAL FUNCTION PANEL    Collection Time: 11/25/22  2:12 AM   Result Value Ref Range    Sodium 135 (L) 136 - 145 mmol/L    Potassium 4.6 3.5 - 5.1 mmol/L    Chloride 108 97 - 108 mmol/L    CO2 22 21 - 32 mmol/L    Anion gap 5 5 - 15 mmol/L    Glucose 126 (H) 65 - 100 mg/dL    BUN 14 6 - 20 MG/DL    Creatinine 0.62 (L) 0.70 - 1.30 MG/DL    BUN/Creatinine ratio 23 (H) 12 - 20      eGFR >60 >60 ml/min/1.73m2    Calcium 9.8 8.5 - 10.1 MG/DL    Phosphorus 3.0 2.6 - 4.7 MG/DL    Albumin 3.5 3.5 - 5.0 g/dL   CBC WITH AUTOMATED DIFF    Collection Time: 11/25/22  2:12 AM   Result Value Ref Range    WBC 11.1 4.1 - 11.1 K/uL    RBC 4.74 4.10 - 5.70 M/uL    HGB 14.6 12.1 - 17.0 g/dL    HCT 43.2 36.6 - 50.3 %    MCV 91.1 80.0 - 99.0 FL    MCH 30.8 26.0 - 34.0 PG    MCHC 33.8 30.0 - 36.5 g/dL    RDW 14.3 11.5 - 14.5 %    PLATELET 031 059 - 365 K/uL    MPV 9.9 8.9 - 12.9 FL    NRBC 0.0 0  WBC    ABSOLUTE NRBC 0.00 0.00 - 0.01 K/uL    NEUTROPHILS 55 32 - 75 %    LYMPHOCYTES 33 12 - 49 %    MONOCYTES 9 5 - 13 %    EOSINOPHILS 2 0 - 7 %    BASOPHILS 1 0 - 1 %    IMMATURE GRANULOCYTES 0 0.0 - 0.5 %    ABS. NEUTROPHILS 6.1 1.8 - 8.0 K/UL    ABS. LYMPHOCYTES 3.6 (H) 0.8 - 3.5 K/UL    ABS. MONOCYTES 1.0 0.0 - 1.0 K/UL    ABS. EOSINOPHILS 0.2 0.0 - 0.4 K/UL    ABS. BASOPHILS 0.1 0.0 - 0.1 K/UL    ABS. IMM.  GRANS. 0.0 0.00 - 0.04 K/UL    DF AUTOMATED     MAGNESIUM    Collection Time: 11/25/22  2:12 AM   Result Value Ref Range    Magnesium 1. 8 1.6 - 2.4 mg/dL       CTA ABD ART W RUNOFF W WO CONT   Final Result   1.  6.3 x 4.5 cm enhancing left retroperitoneal mass/masses, likely adrenal in   origin. Further evaluation with adrenal mass protocol CT/MRI recommended. Lesion   is amenable to percutaneous biopsy. 2.  Patent bilateral inflow and outflow vessels. 3.  Normal three-vessel runoff bilaterally. XR CHEST PORT   Final Result      Patchy bilateral interstitial opacities can be seen with pulmonary edema or   infection, including atypical/viral etiologies. MRI [facility] SPINE W WO CONT   Final Result   No evidence of epidural abscess. Normal MRI evaluation of the thoracic spine. MRI LUMB SPINE W WO CONT   Final Result   1. Large left adrenal mass incompletely evaluated either representing primary   adrenal tumor or metastatic disease. Pheochromocytoma is occluded. Laboratory   evaluation is recommended including CT of the chest abdomen pelvis to search for   primary   2. Patchy abnormal dorsal paraspinous muscle signal with confluent areas of   postcontrast enhancement. Possible etiologies include an infectious or   inflammatory etiology/myositis without drainable fluid collection. Tumor   infiltration cannot be excluded   3. A discrete epidural collection is not identified. No evidence of discitis            MRI CERV SPINE W WO CONT   Final Result   Multilevel disc and facet degenerative change with congenital narrowing of the   cervical canal.   There is no evidence of epidural abscess. There is moderate canal and severe right foraminal stenosis at C3-4. Moderate canal and mild right foraminal stenosis at C5-6. Additional, less severe degenerative findings are as described in detail above.                        Active Problems:    Rhabdomyolysis (11/23/2022)        Assessment/Plan:     Rhabdomyolysis- POA        CPK trending downward 3,349        Questionable myositis or rhabdomyolysis related to lumbar paraspinous muscle changes - POA        Elevated troponin etiology related to rhabdomyolysis       - history of smoking cocaine       - urine positive for cocaine and opiates       - MRI negative of diskitis or osteomyelitis       - no leukocytosis       - creatinine 0.62       - urinalysis negative for nitrites and leukocytes       - procalcitonin 4.38 - questionable skin and soft tissue vs myositis.       - B12 normal, TSH 1.40       - continue IVF       - continue aspirin, no statin or bb       - hold ace       - echocardiogram pending       - will monitor labwork    2. Chest xray with edema or atypical PNA - POA       - no shortness of breath or cough       - keep 02 sat greater than 90%       - remains on RA       - COVID-19 and Influenza negative       - continue IV antibiotics         3. Numbness, tingling and inability to move feet bilaterally - POA      Bilateral foot pain R>L    - still cannot dorsiflex or plantar flex feet, able to lift legs. +2 DP and PT pulses, circulation intact. - CTA abdomen with LE runoff IMPRESSION  1.  6.3 x 4.5 cm enhancing left retroperitoneal mass/masses, likely adrenal in  origin. Further evaluation with adrenal mass protocol CT/MRI recommended. Lesion  is amenable to percutaneous biopsy. 2.  Patent bilateral inflow and outflow vessels. 3.  Normal three-vessel runoff bilaterally. MRI C-spine               Multilevel disc and facet degenerative change with congenital narrowing of the                   cervical canal.              There is no evidence of epidural abscess. There is moderate canal and severe right foraminal stenosis at C3-4. MRI thoracic spine   No evidence of epidural abscess. Normal MRI evaluation of the thoracic spine. MRI lumbar spine IMPRESSION  1. Large left adrenal mass incompletely evaluated either representing primary  adrenal tumor or metastatic disease. Pheochromocytoma is occluded.  Laboratory  evaluation is recommended including CT of the chest abdomen pelvis to search for  primary  2. Patchy abnormal dorsal paraspinous muscle signal with confluent areas of  postcontrast enhancement. Possible etiologies include an infectious or  inflammatory etiology/myositis without drainable fluid collection. Tumor  infiltration cannot be excluded  3. A discrete epidural collection is not identified. No evidence of discitis  Etiology unclear  ? Bilateral calf compartment syndrome naveed with the rhabdomyolysis   Seems unlikely, orthopedics evaluated, no evidence of this at present              No evidence of spinal cord process               Seen by neurosurgery  in ED              ? Brain imaging of any value              ? Peripheral nerve injury while immobile. Appreciate neurology input as well            - Check B12 and TSH            - continue current pain medications    4. Low phos - resolved      - Phosphorus 3.0, Mg+ 1.8       - will continue to monitor labs    5. Questionable left adrenal mass 6.3 x 4.5 cm enhancing POA  - Noted on CTA scans and lumbar MRI  - Left retroperitoneal mass/masses, likely adrenal in origin  - Check metanephrines  - May require percutanous biopsy once acute issues resolved  - patient aware of mass     6. Essential HTN POA   - will increase dose of norvasc   - continue PRN hydralazine    7. Drug screen + for cocaine and opiates  - Uses intranasal, denies IVDA     8. Tobacco use < 1 PPD    - continue nicotine patch    PT/OT following    Discharge disposition: patient is homeless, being followed by CM for disposition.     DVT Prophylaxis: Lovenox  Code Status:  Full Code  POA: none    Care Plan discussed with: patient, nursing, CM  _______________________________________________________________    Daniel Lane NP

## 2022-11-26 ENCOUNTER — APPOINTMENT (OUTPATIENT)
Dept: NON INVASIVE DIAGNOSTICS | Age: 37
End: 2022-11-26
Attending: NURSE PRACTITIONER
Payer: MEDICAID

## 2022-11-26 LAB
ALBUMIN SERPL-MCNC: 3.8 G/DL (ref 3.5–5)
ANION GAP SERPL CALC-SCNC: 7 MMOL/L (ref 5–15)
BASOPHILS # BLD: 0.1 K/UL (ref 0–0.1)
BASOPHILS NFR BLD: 1 % (ref 0–1)
BUN SERPL-MCNC: 15 MG/DL (ref 6–20)
BUN/CREAT SERPL: 19 (ref 12–20)
CALCIUM SERPL-MCNC: 9.5 MG/DL (ref 8.5–10.1)
CHLORIDE SERPL-SCNC: 104 MMOL/L (ref 97–108)
CK SERPL-CCNC: 1133 U/L (ref 39–308)
CO2 SERPL-SCNC: 25 MMOL/L (ref 21–32)
CREAT SERPL-MCNC: 0.81 MG/DL (ref 0.7–1.3)
DATE LAST DOSE: ABNORMAL
DIFFERENTIAL METHOD BLD: ABNORMAL
EOSINOPHIL # BLD: 0.3 K/UL (ref 0–0.4)
EOSINOPHIL NFR BLD: 2 % (ref 0–7)
ERYTHROCYTE [DISTWIDTH] IN BLOOD BY AUTOMATED COUNT: 14.1 % (ref 11.5–14.5)
GLUCOSE SERPL-MCNC: 117 MG/DL (ref 65–100)
HCT VFR BLD AUTO: 44.9 % (ref 36.6–50.3)
HGB BLD-MCNC: 15.3 G/DL (ref 12.1–17)
IMM GRANULOCYTES # BLD AUTO: 0.1 K/UL (ref 0–0.04)
IMM GRANULOCYTES NFR BLD AUTO: 0 % (ref 0–0.5)
LYMPHOCYTES # BLD: 3.6 K/UL (ref 0.8–3.5)
LYMPHOCYTES NFR BLD: 27 % (ref 12–49)
MCH RBC QN AUTO: 31.5 PG (ref 26–34)
MCHC RBC AUTO-ENTMCNC: 34.1 G/DL (ref 30–36.5)
MCV RBC AUTO: 92.6 FL (ref 80–99)
MONOCYTES # BLD: 1 K/UL (ref 0–1)
MONOCYTES NFR BLD: 7 % (ref 5–13)
NEUTS SEG # BLD: 8.4 K/UL (ref 1.8–8)
NEUTS SEG NFR BLD: 63 % (ref 32–75)
NRBC # BLD: 0 K/UL (ref 0–0.01)
NRBC BLD-RTO: 0 PER 100 WBC
PHOSPHATE SERPL-MCNC: 4.5 MG/DL (ref 2.6–4.7)
PLATELET # BLD AUTO: 405 K/UL (ref 150–400)
PMV BLD AUTO: 10.4 FL (ref 8.9–12.9)
POTASSIUM SERPL-SCNC: 4.3 MMOL/L (ref 3.5–5.1)
RBC # BLD AUTO: 4.85 M/UL (ref 4.1–5.7)
REPORTED DOSE,DOSE: ABNORMAL UNITS
REPORTED DOSE/TIME,TMG: ABNORMAL
SODIUM SERPL-SCNC: 136 MMOL/L (ref 136–145)
VANCOMYCIN TROUGH SERPL-MCNC: 4.5 UG/ML (ref 5–10)
WBC # BLD AUTO: 13.3 K/UL (ref 4.1–11.1)

## 2022-11-26 PROCEDURE — 82550 ASSAY OF CK (CPK): CPT

## 2022-11-26 PROCEDURE — 74011250637 HC RX REV CODE- 250/637: Performed by: INTERNAL MEDICINE

## 2022-11-26 PROCEDURE — 74011250636 HC RX REV CODE- 250/636: Performed by: INTERNAL MEDICINE

## 2022-11-26 PROCEDURE — 83835 ASSAY OF METANEPHRINES: CPT

## 2022-11-26 PROCEDURE — 85025 COMPLETE CBC W/AUTO DIFF WBC: CPT

## 2022-11-26 PROCEDURE — 80069 RENAL FUNCTION PANEL: CPT

## 2022-11-26 PROCEDURE — 99233 SBSQ HOSP IP/OBS HIGH 50: CPT | Performed by: PSYCHIATRY & NEUROLOGY

## 2022-11-26 PROCEDURE — 36415 COLL VENOUS BLD VENIPUNCTURE: CPT

## 2022-11-26 PROCEDURE — 74011250637 HC RX REV CODE- 250/637: Performed by: NURSE PRACTITIONER

## 2022-11-26 PROCEDURE — 65270000046 HC RM TELEMETRY

## 2022-11-26 PROCEDURE — 74011000258 HC RX REV CODE- 258: Performed by: INTERNAL MEDICINE

## 2022-11-26 PROCEDURE — 74011000250 HC RX REV CODE- 250: Performed by: INTERNAL MEDICINE

## 2022-11-26 PROCEDURE — 74011250636 HC RX REV CODE- 250/636: Performed by: NURSE PRACTITIONER

## 2022-11-26 PROCEDURE — 80202 ASSAY OF VANCOMYCIN: CPT

## 2022-11-26 PROCEDURE — 94760 N-INVAS EAR/PLS OXIMETRY 1: CPT

## 2022-11-26 RX ORDER — ZOLPIDEM TARTRATE 5 MG/1
5 TABLET ORAL
Status: DISCONTINUED | OUTPATIENT
Start: 2022-11-26 | End: 2022-12-02 | Stop reason: HOSPADM

## 2022-11-26 RX ORDER — VANCOMYCIN HYDROCHLORIDE
1250 EVERY 8 HOURS
Status: DISCONTINUED | OUTPATIENT
Start: 2022-11-26 | End: 2022-11-29

## 2022-11-26 RX ADMIN — VANCOMYCIN HYDROCHLORIDE 1250 MG: 10 INJECTION, POWDER, LYOPHILIZED, FOR SOLUTION INTRAVENOUS at 22:15

## 2022-11-26 RX ADMIN — MORPHINE SULFATE 2 MG: 2 INJECTION, SOLUTION INTRAMUSCULAR; INTRAVENOUS at 11:30

## 2022-11-26 RX ADMIN — MORPHINE SULFATE 2 MG: 2 INJECTION, SOLUTION INTRAMUSCULAR; INTRAVENOUS at 23:37

## 2022-11-26 RX ADMIN — MORPHINE SULFATE 2 MG: 2 INJECTION, SOLUTION INTRAMUSCULAR; INTRAVENOUS at 05:01

## 2022-11-26 RX ADMIN — MORPHINE SULFATE 2 MG: 2 INJECTION, SOLUTION INTRAMUSCULAR; INTRAVENOUS at 01:46

## 2022-11-26 RX ADMIN — OXYCODONE 5 MG: 5 TABLET ORAL at 21:26

## 2022-11-26 RX ADMIN — SODIUM CHLORIDE, PRESERVATIVE FREE 10 ML: 5 INJECTION INTRAVENOUS at 05:36

## 2022-11-26 RX ADMIN — AMLODIPINE BESYLATE 10 MG: 5 TABLET ORAL at 08:28

## 2022-11-26 RX ADMIN — ASPIRIN 81 MG: 81 TABLET, CHEWABLE ORAL at 08:27

## 2022-11-26 RX ADMIN — MELATONIN 3 MG: at 21:26

## 2022-11-26 RX ADMIN — OXYCODONE 5 MG: 5 TABLET ORAL at 08:32

## 2022-11-26 RX ADMIN — THIAMINE HCL TAB 100 MG 200 MG: 100 TAB at 08:27

## 2022-11-26 RX ADMIN — SODIUM CHLORIDE, PRESERVATIVE FREE 10 ML: 5 INJECTION INTRAVENOUS at 21:26

## 2022-11-26 RX ADMIN — OXYCODONE 5 MG: 5 TABLET ORAL at 14:52

## 2022-11-26 RX ADMIN — VANCOMYCIN HYDROCHLORIDE 1250 MG: 10 INJECTION, POWDER, LYOPHILIZED, FOR SOLUTION INTRAVENOUS at 14:51

## 2022-11-26 RX ADMIN — SODIUM CHLORIDE 100 ML/HR: 9 INJECTION, SOLUTION INTRAVENOUS at 05:01

## 2022-11-26 RX ADMIN — ZOLPIDEM TARTRATE 5 MG: 5 TABLET ORAL at 21:28

## 2022-11-26 RX ADMIN — CEFTRIAXONE 1 G: 1 INJECTION, POWDER, FOR SOLUTION INTRAMUSCULAR; INTRAVENOUS at 08:27

## 2022-11-26 RX ADMIN — MORPHINE SULFATE 2 MG: 2 INJECTION, SOLUTION INTRAMUSCULAR; INTRAVENOUS at 17:58

## 2022-11-26 RX ADMIN — VANCOMYCIN HYDROCHLORIDE 1000 MG: 1 INJECTION, POWDER, LYOPHILIZED, FOR SOLUTION INTRAVENOUS at 09:38

## 2022-11-26 NOTE — PROGRESS NOTES
Hospitalist Progress Note    Subjective:   Daily Progress Note: 11/26/2022 7:52 AM    Hospital Course: Patient presented to the emergency room on 11/22/22 after passing out in the basement for an unknown period of time after using multiple drugs. When he came to, he was unable to move either leg and had numbness throughout. Continues to have altered sensation to both feet and ankles right greater than left. Subjective: Patient observed resting in bed with eyes opened. States that he has not slept in days. Reports that numbness has improved to left foot however right foot remains numb. Denies chest pain, shortness of breath, palpitations, dizziness or distress. No acute distress noted.     Current Facility-Administered Medications   Medication Dose Route Frequency    Vancomycin - trough  1 Each Other ONCE    amLODIPine (NORVASC) tablet 10 mg  10 mg Oral DAILY    cefTRIAXone (ROCEPHIN) 1 g in 0.9% sodium chloride (MBP/ADV) 50 mL MBP  1 g IntraVENous Q24H    morphine injection 2 mg  2 mg IntraVENous Q3H PRN    vancomycin (VANCOCIN) 1,000 mg in 0.9% sodium chloride 250 mL ([id number])  1,000 mg IntraVENous Q12H    aspirin chewable tablet 81 mg  81 mg Oral DAILY    melatonin tablet 3 mg  3 mg Oral QHS    sodium chloride (NS) flush 5-40 mL  5-40 mL IntraVENous Q8H    sodium chloride (NS) flush 5-40 mL  5-40 mL IntraVENous PRN    acetaminophen (TYLENOL) tablet 650 mg  650 mg Oral Q6H PRN    Or    acetaminophen (TYLENOL) suppository 650 mg  650 mg Rectal Q6H PRN    polyethylene glycol (MIRALAX) packet 17 g  17 g Oral DAILY    bisacodyL (DULCOLAX) tablet 5 mg  5 mg Oral DAILY PRN    promethazine (PHENERGAN) tablet 12.5 mg  12.5 mg Oral Q6H PRN    Or    ondansetron (ZOFRAN) injection 4 mg  4 mg IntraVENous Q6H PRN    enoxaparin (LOVENOX) injection 40 mg  40 mg SubCUTAneous DAILY    hydrALAZINE (APRESOLINE) 20 mg/mL injection 20 mg  20 mg IntraVENous Q6H PRN    oxyCODONE IR (ROXICODONE) tablet 5 mg  5 mg Oral Q6H PRN 0.9% sodium chloride infusion  100 mL/hr IntraVENous CONTINUOUS    nicotine (NICODERM CQ) 14 mg/24 hr patch 1 Patch  1 Patch TransDERmal DAILY    thiamine mononitrate (B-1) tablet 200 mg  200 mg Oral DAILY        Review of Systems:  Review of Systems   Constitutional: Negative. HENT: Negative. Eyes: Negative. Respiratory: Negative. Cardiovascular: Negative. Gastrointestinal: Negative. Skin: Negative. Neurological:  Positive for weakness. Numbness/tingling to both feet and ankles right greater than left. Endo/Heme/Allergies: Negative. Psychiatric/Behavioral: Negative. Objective:     Visit Vitals  /87   Pulse 88   Temp 97.9 °F (36.6 °C)   Resp 18   Ht 5' 9\" (1.753 m)   Wt 80.7 kg (178 lb)   SpO2 100%   BMI 26.29 kg/m²      O2 Device: None (Room air)    Temp (24hrs), Av.2 °F (36.8 °C), Min:97.9 °F (36.6 °C), Max:98.8 °F (37.1 °C)      No intake/output data recorded.  1901 -  0700  In: 800 [I.V.:800]  Out: 5025 [Urine:5025]    PHYSICAL EXAM:  Physical Exam  Vitals reviewed. HENT:      Head: Normocephalic and atraumatic. Right Ear: External ear normal.      Left Ear: External ear normal.      Nose: Nose normal.      Mouth/Throat:      Pharynx: Oropharynx is clear. Eyes:      Pupils: Pupils are equal, round, and reactive to light. Cardiovascular:      Rate and Rhythm: Normal rate and regular rhythm. Pulses: Normal pulses. Heart sounds: Normal heart sounds. Pulmonary:      Effort: Pulmonary effort is normal.      Breath sounds: Normal breath sounds. Abdominal:      General: Bowel sounds are normal.      Comments: Last reported bowel movement on 22. Musculoskeletal:      Cervical back: Normal range of motion. Comments: Generalized weakness of both feet and ankles. Skin:     General: Skin is warm and dry. Capillary Refill: Capillary refill takes 2 to 3 seconds.       Comments: Mutliple scars to knuckles on both hands  Multiple scars and scratches to anterior surface of both feet. Neurological:      Mental Status: He is alert and oriented to person, place, and time. Comments: Altered sensation to both feet and toes. Psychiatric:         Mood and Affect: Mood normal.     Data Review    Recent Results (from the past 24 hour(s))   CK    Collection Time: 11/26/22  4:48 AM   Result Value Ref Range    CK 1,133 (H) 39 - 308 U/L   RENAL FUNCTION PANEL    Collection Time: 11/26/22  4:48 AM   Result Value Ref Range    Sodium 136 136 - 145 mmol/L    Potassium 4.3 3.5 - 5.1 mmol/L    Chloride 104 97 - 108 mmol/L    CO2 25 21 - 32 mmol/L    Anion gap 7 5 - 15 mmol/L    Glucose 117 (H) 65 - 100 mg/dL    BUN 15 6 - 20 MG/DL    Creatinine 0.81 0.70 - 1.30 MG/DL    BUN/Creatinine ratio 19 12 - 20      eGFR >60 >60 ml/min/1.73m2    Calcium 9.5 8.5 - 10.1 MG/DL    Phosphorus 4.5 2.6 - 4.7 MG/DL    Albumin 3.8 3.5 - 5.0 g/dL   CBC WITH AUTOMATED DIFF    Collection Time: 11/26/22  4:48 AM   Result Value Ref Range    WBC 13.3 (H) 4.1 - 11.1 K/uL    RBC 4.85 4.10 - 5.70 M/uL    HGB 15.3 12.1 - 17.0 g/dL    HCT 44.9 36.6 - 50.3 %    MCV 92.6 80.0 - 99.0 FL    MCH 31.5 26.0 - 34.0 PG    MCHC 34.1 30.0 - 36.5 g/dL    RDW 14.1 11.5 - 14.5 %    PLATELET 407 (H) 289 - 400 K/uL    MPV 10.4 8.9 - 12.9 FL    NRBC 0.0 0  WBC    ABSOLUTE NRBC 0.00 0.00 - 0.01 K/uL    NEUTROPHILS 63 32 - 75 %    LYMPHOCYTES 27 12 - 49 %    MONOCYTES 7 5 - 13 %    EOSINOPHILS 2 0 - 7 %    BASOPHILS 1 0 - 1 %    IMMATURE GRANULOCYTES 0 0.0 - 0.5 %    ABS. NEUTROPHILS 8.4 (H) 1.8 - 8.0 K/UL    ABS. LYMPHOCYTES 3.6 (H) 0.8 - 3.5 K/UL    ABS. MONOCYTES 1.0 0.0 - 1.0 K/UL    ABS. EOSINOPHILS 0.3 0.0 - 0.4 K/UL    ABS. BASOPHILS 0.1 0.0 - 0.1 K/UL    ABS. IMM. GRANS. 0.1 (H) 0.00 - 0.04 K/UL    DF AUTOMATED         CTA ABD ART W RUNOFF W WO CONT   Final Result   1.  6.3 x 4.5 cm enhancing left retroperitoneal mass/masses, likely adrenal in   origin.  Further evaluation with adrenal mass protocol CT/MRI recommended. Lesion   is amenable to percutaneous biopsy. 2.  Patent bilateral inflow and outflow vessels. 3.  Normal three-vessel runoff bilaterally. XR CHEST PORT   Final Result      Patchy bilateral interstitial opacities can be seen with pulmonary edema or   infection, including atypical/viral etiologies. MRI [facility] SPINE W WO CONT   Final Result   No evidence of epidural abscess. Normal MRI evaluation of the thoracic spine. MRI LUMB SPINE W WO CONT   Final Result   1. Large left adrenal mass incompletely evaluated either representing primary   adrenal tumor or metastatic disease. Pheochromocytoma is occluded. Laboratory   evaluation is recommended including CT of the chest abdomen pelvis to search for   primary   2. Patchy abnormal dorsal paraspinous muscle signal with confluent areas of   postcontrast enhancement. Possible etiologies include an infectious or   inflammatory etiology/myositis without drainable fluid collection. Tumor   infiltration cannot be excluded   3. A discrete epidural collection is not identified. No evidence of discitis            MRI CERV SPINE W WO CONT   Final Result   Multilevel disc and facet degenerative change with congenital narrowing of the   cervical canal.   There is no evidence of epidural abscess. There is moderate canal and severe right foraminal stenosis at C3-4. Moderate canal and mild right foraminal stenosis at C5-6. Additional, less severe degenerative findings are as described in detail above.                        Active Problems:    Rhabdomyolysis (11/23/2022)        Assessment/Plan:   Rhabdomyolysis- POA        CPK trending downward 1,133, trending downward        Questionable myositis or rhabdomyolysis related to lumbar paraspinous muscle changes - POA        Elevated troponin etiology related to rhabdomyolysis       - history of smoking cocaine       - urine positive for cocaine and opiates       - MRI negative of diskitis or osteomyelitis       - WBC 13.3       - creatinine 0.81       - urinalysis negative for nitrites and leukocytes       - procalcitonin 4.38 - questionable skin and soft tissue vs myositis.       - B12 normal, TSH 1.40       - continue IVF and antibiotics       - continue aspirin, no statin or bb       - hold ace       - echocardiogram pending       - will monitor labwork     2. Chest xray with edema or atypical PNA - POA       - no shortness of breath or cough       - keep 02 sat greater than 90%       - remains on RA       - COVID-19 and Influenza negative       - continue IV antibiotics         3. Numbness, tingling and inability to move feet bilaterally - POA      Bilateral foot pain R>L    - still cannot dorsiflex or plantar flex feet, able to lift legs. +2 DP and PT pulses, circulation intact. - CTA abdomen with LE runoff IMPRESSION  1.  6.3 x 4.5 cm enhancing left retroperitoneal mass/masses, likely adrenal in  origin. Further evaluation with adrenal mass protocol CT/MRI recommended. Lesion  is amenable to percutaneous biopsy. 2.  Patent bilateral inflow and outflow vessels. 3.  Normal three-vessel runoff bilaterally. MRI C-spine               Multilevel disc and facet degenerative change with congenital narrowing of the                   cervical canal.              There is no evidence of epidural abscess. There is moderate canal and severe right foraminal stenosis at C3-4. MRI thoracic spine   No evidence of epidural abscess. Normal MRI evaluation of the thoracic spine. MRI lumbar spine IMPRESSION  1. Large left adrenal mass incompletely evaluated either representing primary  adrenal tumor or metastatic disease. Pheochromocytoma is occluded. Laboratory  evaluation is recommended including CT of the chest abdomen pelvis to search for  primary  2.  Patchy abnormal dorsal paraspinous muscle signal with confluent areas of  postcontrast enhancement. Possible etiologies include an infectious or  inflammatory etiology/myositis without drainable fluid collection. Tumor  infiltration cannot be excluded  3. A discrete epidural collection is not identified. No evidence of discitis  Etiology unclear  ? Bilateral calf compartment syndrome naveed with the rhabdomyolysis   Seems unlikely, orthopedics evaluated, no evidence of this at present              No evidence of spinal cord process               Seen by neurosurgery  in ED              ? Brain imaging of any value              ? Peripheral nerve injury while immobile. Appreciate neurology input as well            - Check B12 and TSH            - continue current pain medications     4. Low phos - resolved      - Phosphorus 4.5      - will continue to monitor labs     5. Questionable left adrenal mass 6.3 x 4.5 cm enhancing POA  - Noted on CTA scans and lumbar MRI  - Left retroperitoneal mass/masses, likely adrenal in origin  - Metanephrine lab in process  - will have endocrinology to evaluate. 6. Essential HTN POA   - will increase dose of norvasc   - continue PRN hydralazine     7. Drug screen + for cocaine and opiates  - Uses intranasal, denies IVDA     8. Tobacco use < 1 PPD    - continue nicotine patch       PT/OT following     Discharge disposition: patient is homeless, being followed by CM for disposition.      DVT Prophylaxis: Lovenox  Code Status:  Full Code  POA: none     Care Plan discussed with: patient, nursing, CM    _______________________________________________________________    Renetta Sheridan NP

## 2022-11-26 NOTE — PROGRESS NOTES
Don of Shift Note        Shift worked:  [id number]     Shift summary and any significant changes:     Pleasant. Endurance catheter placed by PICC team due to painful PIV. PRNs administered for pain in bilateral feet. Assisted patient to bathroom with walker, and also voiding well in urinal. Appetite good. Echo ordered by Katarina Jin NP. Transferred to [address] on Ortho.       Concerns for physician to address:       Zone phone for oncoming shift:          2      Corey Santiago RN

## 2022-11-26 NOTE — PROGRESS NOTES
Danyd of Shift Note    Bedside shift change report given to [address] (oncoming nurse) by Fidelina Henriquez RN (offgoing nurse). Report included the following information SBAR, Kardex, Intake/Output, MAR, and Recent Results    Shift worked:  [id number]     Shift summary and any significant changes:     VS stable, pt up with 1 assist and walker to bathroom voiding clear yellow urine without difficulties, prn morphine given for complaints of pain. Concerns for physician to address:       Zone phone for oncoming shift:   [phone]       Activity:  Activity Level: Up with Assistance  Number times ambulated in hallways past shift: 0  Number of times OOB to chair past shift: 1    Cardiac:   Cardiac Monitoring: No           Access:  Current line(s): PIV     Genitourinary:   Urinary status: voiding    Respiratory:   O2 Device: None (Room air)  Chronic home O2 use?: NO  Incentive spirometer at bedside: N/A       GI:  Last Bowel Movement Date: 11/25/22  Current diet:  ADULT DIET Regular  Passing flatus: YES  Tolerating current diet: YES       Pain Management:   Patient states pain is manageable on current regimen: YES    Skin:  Rocael Score: 20  Interventions: float heels, increase time out of bed, PT/OT consult, and limit briefs    Patient Safety:  Fall Score:  Total Score: 3  Interventions: assistive device (walker, cane, etc), gripper socks, pt to call before getting OOB, and stay with me (per policy)  High Fall Risk: Yes    Length of Stay:  Expected LOS: 3d 2h  Actual LOS: 2      Melia Lopez RN

## 2022-11-26 NOTE — PROGRESS NOTES
Pharmacy Antimicrobial Kinetic Dosing    Indication for Antimicrobials: SSTI, CAP     Current Regimen of Each Antimicrobial:  Vancomycin - pharmacy to dose; started ; day 3  Ceftriaxone 1g IV q24h, started , day 3    Previous Antimicrobial Therapy:    Goal Level: AUC: 400-600 mg/hr/Liter/day    Date Dose & Interval Measured (mcg/mL) Predicted AUC/HEATHER    0847 1g q 12 H 4.5 293/6.6                 Dosing calculator used: Total-trax calculator    Significant Positive Cultures:    blood cx NGTD pending    Conditions for Dosing Consideration: None    Labs:  Recent Labs     22  0019   CREA 0.81 0.62* 0.84  0.83   BUN 15 14 18  18   PCT  --   --  4.38     Recent Labs     229   WBC 13.3* 11.1 9.7     Temp (24hrs), Av.3 °F (36.8 °C), Min:97.9 °F (36.6 °C), Max:98.8 °F (37.1 °C)    Creatinine Clearance (mL/min):   CrCl (Adjusted Body Weight): 129.4 mL/min   If actual weight < IBW: CrCl (Actual Body Weight) 139.8    Impression/Plan:   Scr stable  WBC trending up  Afebrile  Vancomycin trough subtherapeutic, will adjust the Vancomycin to 1250 mg IV q 8 h for an estimated AUC of 548  Antimicrobial stop date pending     Pharmacy will follow daily and adjust medications as appropriate for renal function and/or serum levels.     Thank you,  Ariela Saha, PHARMD

## 2022-11-26 NOTE — PROGRESS NOTES
Problem: Falls - Risk of  Goal: *Absence of Falls  Description: Document Cherylle Cockayne Fall Risk and appropriate interventions in the flowsheet.   Outcome: Progressing Towards Goal  Note: Fall Risk Interventions:  Mobility Interventions: Assess mobility with egress test, Communicate number of staff needed for ambulation/transfer, OT consult for ADLs, Patient to call before getting OOB, PT Consult for mobility concerns, PT Consult for assist device competence, Strengthening exercises (ROM-active/passive), Utilize walker, cane, or other assistive device, Utilize gait belt for transfers/ambulation         Medication Interventions: Assess postural VS orthostatic hypotension, Bed/chair exit alarm, Evaluate medications/consider consulting pharmacy, Teach patient to arise slowly, Utilize gait belt for transfers/ambulation, Patient to call before getting OOB    Elimination Interventions: Call light in reach, Elevated toilet seat, Patient to call for help with toileting needs, Stay With Me (per policy), Toilet paper/wipes in reach, Toileting schedule/hourly rounds, Urinal in reach              Problem: Patient Education: Go to Patient Education Activity  Goal: Patient/Family Education  Outcome: Progressing Towards Goal     Problem: General Medical Care Plan  Goal: *Vital signs within specified parameters  Outcome: Progressing Towards Goal  Goal: *Labs within defined limits  Outcome: Progressing Towards Goal  Goal: *Optimal pain control at patient's stated goal  Outcome: Progressing Towards Goal  Goal: *Skin integrity maintained  Outcome: Progressing Towards Goal  Goal: *Anxiety reduced or absent  Outcome: Progressing Towards Goal     Problem: Patient Education: Go to Patient Education Activity  Goal: Patient/Family Education  Outcome: Progressing Towards Goal     Problem: Patient Education: Go to Patient Education Activity  Goal: Patient/Family Education  Outcome: Progressing Towards Goal

## 2022-11-26 NOTE — PROGRESS NOTES
Problem: Falls - Risk of  Goal: *Absence of Falls  Description: Document Duran Horan Fall Risk and appropriate interventions in the flowsheet.   Outcome: Progressing Towards Goal  Note: Fall Risk Interventions:  Mobility Interventions: Assess mobility with egress test, Communicate number of staff needed for ambulation/transfer, OT consult for ADLs, Patient to call before getting OOB, PT Consult for mobility concerns, PT Consult for assist device competence, Strengthening exercises (ROM-active/passive), Utilize walker, cane, or other assistive device, Utilize gait belt for transfers/ambulation         Medication Interventions: Assess postural VS orthostatic hypotension, Bed/chair exit alarm, Evaluate medications/consider consulting pharmacy, Teach patient to arise slowly, Utilize gait belt for transfers/ambulation, Patient to call before getting OOB    Elimination Interventions: Call light in reach, Elevated toilet seat, Patient to call for help with toileting needs, Stay With Me (per policy), Toilet paper/wipes in reach, Toileting schedule/hourly rounds, Urinal in reach
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
## 2022-11-27 LAB
ALBUMIN SERPL-MCNC: 3.5 G/DL (ref 3.5–5)
ANION GAP SERPL CALC-SCNC: 7 MMOL/L (ref 5–15)
BASOPHILS # BLD: 0.1 K/UL (ref 0–0.1)
BASOPHILS NFR BLD: 1 % (ref 0–1)
BUN SERPL-MCNC: 14 MG/DL (ref 6–20)
BUN/CREAT SERPL: 21 (ref 12–20)
CALCIUM SERPL-MCNC: 9.5 MG/DL (ref 8.5–10.1)
CHLORIDE SERPL-SCNC: 107 MMOL/L (ref 97–108)
CK SERPL-CCNC: 298 U/L (ref 39–308)
CO2 SERPL-SCNC: 25 MMOL/L (ref 21–32)
CREAT SERPL-MCNC: 0.68 MG/DL (ref 0.7–1.3)
DIFFERENTIAL METHOD BLD: NORMAL
EOSINOPHIL # BLD: 0.3 K/UL (ref 0–0.4)
EOSINOPHIL NFR BLD: 4 % (ref 0–7)
ERYTHROCYTE [DISTWIDTH] IN BLOOD BY AUTOMATED COUNT: 14.1 % (ref 11.5–14.5)
GLUCOSE SERPL-MCNC: 146 MG/DL (ref 65–100)
HCT VFR BLD AUTO: 43.7 % (ref 36.6–50.3)
HGB BLD-MCNC: 14.9 G/DL (ref 12.1–17)
IMM GRANULOCYTES # BLD AUTO: 0 K/UL (ref 0–0.04)
IMM GRANULOCYTES NFR BLD AUTO: 0 % (ref 0–0.5)
LYMPHOCYTES # BLD: 2.5 K/UL (ref 0.8–3.5)
LYMPHOCYTES NFR BLD: 27 % (ref 12–49)
MCH RBC QN AUTO: 30.9 PG (ref 26–34)
MCHC RBC AUTO-ENTMCNC: 34.1 G/DL (ref 30–36.5)
MCV RBC AUTO: 90.7 FL (ref 80–99)
MONOCYTES # BLD: 0.7 K/UL (ref 0–1)
MONOCYTES NFR BLD: 7 % (ref 5–13)
NEUTS SEG # BLD: 5.9 K/UL (ref 1.8–8)
NEUTS SEG NFR BLD: 61 % (ref 32–75)
NRBC # BLD: 0 K/UL (ref 0–0.01)
NRBC BLD-RTO: 0 PER 100 WBC
PHOSPHATE SERPL-MCNC: 3.4 MG/DL (ref 2.6–4.7)
PLATELET # BLD AUTO: 340 K/UL (ref 150–400)
PMV BLD AUTO: 9.9 FL (ref 8.9–12.9)
POTASSIUM SERPL-SCNC: 4 MMOL/L (ref 3.5–5.1)
RBC # BLD AUTO: 4.82 M/UL (ref 4.1–5.7)
SODIUM SERPL-SCNC: 139 MMOL/L (ref 136–145)
WBC # BLD AUTO: 9.5 K/UL (ref 4.1–11.1)

## 2022-11-27 PROCEDURE — 74011000250 HC RX REV CODE- 250: Performed by: INTERNAL MEDICINE

## 2022-11-27 PROCEDURE — 36415 COLL VENOUS BLD VENIPUNCTURE: CPT

## 2022-11-27 PROCEDURE — 74011250637 HC RX REV CODE- 250/637: Performed by: NURSE PRACTITIONER

## 2022-11-27 PROCEDURE — 74011000258 HC RX REV CODE- 258: Performed by: INTERNAL MEDICINE

## 2022-11-27 PROCEDURE — 74011250636 HC RX REV CODE- 250/636: Performed by: INTERNAL MEDICINE

## 2022-11-27 PROCEDURE — 74011250636 HC RX REV CODE- 250/636: Performed by: NURSE PRACTITIONER

## 2022-11-27 PROCEDURE — 80069 RENAL FUNCTION PANEL: CPT

## 2022-11-27 PROCEDURE — 74011250637 HC RX REV CODE- 250/637: Performed by: INTERNAL MEDICINE

## 2022-11-27 PROCEDURE — 85025 COMPLETE CBC W/AUTO DIFF WBC: CPT

## 2022-11-27 PROCEDURE — 94760 N-INVAS EAR/PLS OXIMETRY 1: CPT

## 2022-11-27 PROCEDURE — 65270000046 HC RM TELEMETRY

## 2022-11-27 PROCEDURE — 82550 ASSAY OF CK (CPK): CPT

## 2022-11-27 RX ADMIN — MORPHINE SULFATE 2 MG: 2 INJECTION, SOLUTION INTRAMUSCULAR; INTRAVENOUS at 08:42

## 2022-11-27 RX ADMIN — MORPHINE SULFATE 2 MG: 2 INJECTION, SOLUTION INTRAMUSCULAR; INTRAVENOUS at 16:00

## 2022-11-27 RX ADMIN — OXYCODONE 5 MG: 5 TABLET ORAL at 13:00

## 2022-11-27 RX ADMIN — SODIUM CHLORIDE 100 ML/HR: 9 INJECTION, SOLUTION INTRAVENOUS at 21:09

## 2022-11-27 RX ADMIN — SODIUM CHLORIDE, PRESERVATIVE FREE 10 ML: 5 INJECTION INTRAVENOUS at 22:27

## 2022-11-27 RX ADMIN — ASPIRIN 81 MG: 81 TABLET, CHEWABLE ORAL at 08:41

## 2022-11-27 RX ADMIN — SODIUM CHLORIDE, PRESERVATIVE FREE 10 ML: 5 INJECTION INTRAVENOUS at 05:19

## 2022-11-27 RX ADMIN — OXYCODONE 5 MG: 5 TABLET ORAL at 20:08

## 2022-11-27 RX ADMIN — SODIUM CHLORIDE, PRESERVATIVE FREE 10 ML: 5 INJECTION INTRAVENOUS at 13:00

## 2022-11-27 RX ADMIN — OXYCODONE 5 MG: 5 TABLET ORAL at 05:19

## 2022-11-27 RX ADMIN — CEFTRIAXONE 1 G: 1 INJECTION, POWDER, FOR SOLUTION INTRAMUSCULAR; INTRAVENOUS at 08:38

## 2022-11-27 RX ADMIN — VANCOMYCIN HYDROCHLORIDE 1250 MG: 10 INJECTION, POWDER, LYOPHILIZED, FOR SOLUTION INTRAVENOUS at 22:29

## 2022-11-27 RX ADMIN — MORPHINE SULFATE 2 MG: 2 INJECTION, SOLUTION INTRAMUSCULAR; INTRAVENOUS at 22:25

## 2022-11-27 RX ADMIN — ACETAMINOPHEN 650 MG: 325 TABLET ORAL at 20:08

## 2022-11-27 RX ADMIN — SODIUM CHLORIDE 100 ML/HR: 9 INJECTION, SOLUTION INTRAVENOUS at 08:40

## 2022-11-27 RX ADMIN — VANCOMYCIN HYDROCHLORIDE 1250 MG: 10 INJECTION, POWDER, LYOPHILIZED, FOR SOLUTION INTRAVENOUS at 13:00

## 2022-11-27 RX ADMIN — THIAMINE HCL TAB 100 MG 200 MG: 100 TAB at 08:41

## 2022-11-27 RX ADMIN — AMLODIPINE BESYLATE 10 MG: 5 TABLET ORAL at 08:41

## 2022-11-27 RX ADMIN — VANCOMYCIN HYDROCHLORIDE 1250 MG: 10 INJECTION, POWDER, LYOPHILIZED, FOR SOLUTION INTRAVENOUS at 05:19

## 2022-11-27 NOTE — PROGRESS NOTES
Hospitalist Progress Note    Subjective:   Daily Progress Note: 11/27/2022 9:40 AM    Hospital Course:Patient presented to the emergency room on 11/22/22 after passing out in the basement for an unknown period of time after using multiple drugs. When he came to, he was unable to move either leg and had numbness throughout. Continues to have altered sensation to both feet and ankles right greater than left. Subjective: Patient observed resting in bed with eyes closed. Easily arouses when name is called. States that he finally got some sleep overnight. States that sensation to both feet improving. Denies chest pain, shortness of breath, palpitations, dizziness or distress. No acute distress noted.     Current Facility-Administered Medications   Medication Dose Route Frequency    zolpidem (AMBIEN) tablet 5 mg  5 mg Oral QHS PRN    vancomycin (VANCOCIN) 1250 mg in  ml infusion  1,250 mg IntraVENous Q8H    [START ON 11/28/2022] VANCOMYCIN INFORMATION NOTE   Other PRN    amLODIPine (NORVASC) tablet 10 mg  10 mg Oral DAILY    cefTRIAXone (ROCEPHIN) 1 g in 0.9% sodium chloride (MBP/ADV) 50 mL MBP  1 g IntraVENous Q24H    morphine injection 2 mg  2 mg IntraVENous Q3H PRN    aspirin chewable tablet 81 mg  81 mg Oral DAILY    melatonin tablet 3 mg  3 mg Oral QHS    sodium chloride (NS) flush 5-40 mL  5-40 mL IntraVENous Q8H    sodium chloride (NS) flush 5-40 mL  5-40 mL IntraVENous PRN    acetaminophen (TYLENOL) tablet 650 mg  650 mg Oral Q6H PRN    Or    acetaminophen (TYLENOL) suppository 650 mg  650 mg Rectal Q6H PRN    polyethylene glycol (MIRALAX) packet 17 g  17 g Oral DAILY    bisacodyL (DULCOLAX) tablet 5 mg  5 mg Oral DAILY PRN    promethazine (PHENERGAN) tablet 12.5 mg  12.5 mg Oral Q6H PRN    Or    ondansetron (ZOFRAN) injection 4 mg  4 mg IntraVENous Q6H PRN    enoxaparin (LOVENOX) injection 40 mg  40 mg SubCUTAneous DAILY    hydrALAZINE (APRESOLINE) 20 mg/mL injection 20 mg  20 mg IntraVENous Q6H PRN oxyCODONE IR (ROXICODONE) tablet 5 mg  5 mg Oral Q6H PRN    0.9% sodium chloride infusion  100 mL/hr IntraVENous CONTINUOUS    nicotine (NICODERM CQ) 14 mg/24 hr patch 1 Patch  1 Patch TransDERmal DAILY    thiamine mononitrate (B-1) tablet 200 mg  200 mg Oral DAILY        Review of Systems:  Review of Systems   Constitutional: Negative. HENT: Negative. Eyes: Negative. Respiratory: Negative. Cardiovascular: Negative. Gastrointestinal: Negative. Skin: Negative. Neurological:  Positive for weakness. Numbness/tingling to both feet and ankles right greater than left. Endo/Heme/Allergies: Negative. Psychiatric/Behavioral: Negative. Objective:     Visit Vitals  BP (!) 146/92   Pulse 70   Temp 98.1 °F (36.7 °C)   Resp 16   Ht 5' 9\" (1.753 m)   Wt 80.7 kg (178 lb)   SpO2 99%   BMI 26.29 kg/m²      O2 Device: None (Room air)    Temp (24hrs), Av.3 °F (36.8 °C), Min:97.9 °F (36.6 °C), Max:98.8 °F (37.1 °C)      No intake/output data recorded.  1901 -  0700  In: 800 [I.V.:800]  Out: 2100 [Urine:2100]    PHYSICAL EXAM:  Physical Exam  Vitals reviewed. HENT:      Head: Normocephalic and atraumatic. Right Ear: External ear normal.      Left Ear: External ear normal.      Nose: Nose normal.      Mouth/Throat:      Pharynx: Oropharynx is clear. Eyes:      Pupils: Pupils are equal, round, and reactive to light. Cardiovascular:      Rate and Rhythm: Normal rate and regular rhythm. Pulses: Normal pulses. Heart sounds: Normal heart sounds. Pulmonary:      Effort: Pulmonary effort is normal.      Breath sounds: Normal breath sounds. Abdominal:      General: Bowel sounds are normal.      Comments: Last reported bowel movement on 22. Musculoskeletal:      Cervical back: Normal range of motion. Comments: Generalized weakness of both feet and ankles. Right greater than left. Skin:     General: Skin is warm and dry.       Capillary Refill: Capillary refill takes 2 to 3 seconds. Comments: Mutliple scars to knuckles on both hands  Multiple scars and scratches to anterior surface of both feet. Neurological:      Mental Status: He is alert and oriented to person, place, and time. Comments: Altered sensation to both feet and toes right greater than left. Psychiatric:         Mood and Affect: Mood normal.         Data Review    Recent Results (from the past 24 hour(s))   CK    Collection Time: 11/27/22  4:57 AM   Result Value Ref Range     39 - 308 U/L   RENAL FUNCTION PANEL    Collection Time: 11/27/22  4:57 AM   Result Value Ref Range    Sodium 139 136 - 145 mmol/L    Potassium 4.0 3.5 - 5.1 mmol/L    Chloride 107 97 - 108 mmol/L    CO2 25 21 - 32 mmol/L    Anion gap 7 5 - 15 mmol/L    Glucose 146 (H) 65 - 100 mg/dL    BUN 14 6 - 20 MG/DL    Creatinine 0.68 (L) 0.70 - 1.30 MG/DL    BUN/Creatinine ratio 21 (H) 12 - 20      eGFR >60 >60 ml/min/1.73m2    Calcium 9.5 8.5 - 10.1 MG/DL    Phosphorus 3.4 2.6 - 4.7 MG/DL    Albumin 3.5 3.5 - 5.0 g/dL   CBC WITH AUTOMATED DIFF    Collection Time: 11/27/22  4:57 AM   Result Value Ref Range    WBC 9.5 4.1 - 11.1 K/uL    RBC 4.82 4.10 - 5.70 M/uL    HGB 14.9 12.1 - 17.0 g/dL    HCT 43.7 36.6 - 50.3 %    MCV 90.7 80.0 - 99.0 FL    MCH 30.9 26.0 - 34.0 PG    MCHC 34.1 30.0 - 36.5 g/dL    RDW 14.1 11.5 - 14.5 %    PLATELET 323 933 - 425 K/uL    MPV 9.9 8.9 - 12.9 FL    NRBC 0.0 0  WBC    ABSOLUTE NRBC 0.00 0.00 - 0.01 K/uL    NEUTROPHILS 61 32 - 75 %    LYMPHOCYTES 27 12 - 49 %    MONOCYTES 7 5 - 13 %    EOSINOPHILS 4 0 - 7 %    BASOPHILS 1 0 - 1 %    IMMATURE GRANULOCYTES 0 0.0 - 0.5 %    ABS. NEUTROPHILS 5.9 1.8 - 8.0 K/UL    ABS. LYMPHOCYTES 2.5 0.8 - 3.5 K/UL    ABS. MONOCYTES 0.7 0.0 - 1.0 K/UL    ABS. EOSINOPHILS 0.3 0.0 - 0.4 K/UL    ABS. BASOPHILS 0.1 0.0 - 0.1 K/UL    ABS. IMM.  GRANS. 0.0 0.00 - 0.04 K/UL    DF AUTOMATED         CTA ABD ART W RUNOFF W WO CONT   Final Result   1.  6.3 x 4.5 cm enhancing left retroperitoneal mass/masses, likely adrenal in   origin. Further evaluation with adrenal mass protocol CT/MRI recommended. Lesion   is amenable to percutaneous biopsy. 2.  Patent bilateral inflow and outflow vessels. 3.  Normal three-vessel runoff bilaterally. XR CHEST PORT   Final Result      Patchy bilateral interstitial opacities can be seen with pulmonary edema or   infection, including atypical/viral etiologies. MRI [address] SPINE W WO CONT   Final Result   No evidence of epidural abscess. Normal MRI evaluation of the thoracic spine. MRI LUMB SPINE W WO CONT   Final Result   1. Large left adrenal mass incompletely evaluated either representing primary   adrenal tumor or metastatic disease. Pheochromocytoma is occluded. Laboratory   evaluation is recommended including CT of the chest abdomen pelvis to search for   primary   2. Patchy abnormal dorsal paraspinous muscle signal with confluent areas of   postcontrast enhancement. Possible etiologies include an infectious or   inflammatory etiology/myositis without drainable fluid collection. Tumor   infiltration cannot be excluded   3. A discrete epidural collection is not identified. No evidence of discitis            MRI CERV SPINE W WO CONT   Final Result   Multilevel disc and facet degenerative change with congenital narrowing of the   cervical canal.   There is no evidence of epidural abscess. There is moderate canal and severe right foraminal stenosis at C3-4. Moderate canal and mild right foraminal stenosis at C5-6. Additional, less severe degenerative findings are as described in detail above.                        Active Problems:    Rhabdomyolysis (11/23/2022)        Assessment/Plan:   Rhabdomyolysis- POA         Questionable myositis or rhabdomyolysis related to lumbar paraspinous muscle changes - POA        Elevated troponin etiology related to rhabdomyolysis       -        - history of smoking cocaine       - urine positive for cocaine and opiates       - MRI negative of diskitis or osteomyelitis       - WBC 9.5       - urinalysis negative for nitrites and leukocytes       - procalcitonin 4.38 - questionable skin and soft tissue vs myositis.       - B12 normal, TSH 1.40       - continue IVF and antibiotics       - continue aspirin, no statin or bb       - hold ace       - echocardiogram pending       - will monitor labwork     2. Chest xray with edema or atypical PNA - POA       - no shortness of breath or cough       - keep 02 sat greater than 90%       - remains on RA       - COVID-19 and Influenza negative       - continue IV antibiotics         3. Numbness, tingling and inability to move feet bilaterally - POA      Bilateral foot pain R>L    - 4 out of 5 dorsi/plantar flexion bilaterally, able to lift legs. +2 DP and PT pulses, circulation intact. - CTA abdomen with LE runoff IMPRESSION  1.  6.3 x 4.5 cm enhancing left retroperitoneal mass/masses, likely adrenal in  origin. Further evaluation with adrenal mass protocol CT/MRI recommended. Lesion  is amenable to percutaneous biopsy. 2.  Patent bilateral inflow and outflow vessels. 3.  Normal three-vessel runoff bilaterally. MRI C-spine               Multilevel disc and facet degenerative change with congenital narrowing of the                   cervical canal.              There is no evidence of epidural abscess. There is moderate canal and severe right foraminal stenosis at C3-4. MRI thoracic spine   No evidence of epidural abscess. Normal MRI evaluation of the thoracic spine. MRI lumbar spine IMPRESSION  1. Large left adrenal mass incompletely evaluated either representing primary  adrenal tumor or metastatic disease. Pheochromocytoma is occluded. Laboratory  evaluation is recommended including CT of the chest abdomen pelvis to search for  primary  2.  Patchy abnormal dorsal paraspinous muscle signal with confluent areas of  postcontrast enhancement. Possible etiologies include an infectious or  inflammatory etiology/myositis without drainable fluid collection. Tumor  infiltration cannot be excluded  3. A discrete epidural collection is not identified. No evidence of discitis  Etiology unclear  orthopedics evaluated, no evidence of compartment syndrome at present              No evidence of spinal cord process               Seen by neurosurgery  in ED               questionable peripheral nerve injury while immobile. Appreciate neurology input as well            - continue current pain medications     4. Low phos - resolved      - Phosphorus 3.4      - will continue to monitor labs     5. Questionable left adrenal mass 6.3 x 4.5 cm enhancing POA  - Noted on CTA scans and lumbar MRI  - Left retroperitoneal mass/masses, likely adrenal in origin  - Metanephrine lab in process  - will have endocrinology to evaluate. 6. Essential HTN POA   - will increase dose of norvasc   - continue PRN hydralazine     7. Drug screen + for cocaine and opiates  - Uses intranasal, denies IVDA     8. Tobacco use < 1 PPD    - continue nicotine patch        PT- recommends skilled therapy  OT - no needs     Discharge disposition: pending hospital course. Patient is homeless, case management following to assist with disposition.        DVT Prophylaxis: Lovenox  Code Status:  Full Code  POA: none    Care Plan discussed with: patient, nursing  _______________________________________________________________    Tuan Paez, NP

## 2022-11-27 NOTE — PROGRESS NOTES
Problem: Falls - Risk of  Goal: *Absence of Falls  Description: Document Nya Johnson Fall Risk and appropriate interventions in the flowsheet.   Outcome: Progressing Towards Goal  Note: Fall Risk Interventions:  Mobility Interventions: Bed/chair exit alarm, Patient to call before getting OOB         Medication Interventions: Bed/chair exit alarm, Patient to call before getting OOB, Assess postural VS orthostatic hypotension    Elimination Interventions: Call light in reach              Problem: Patient Education: Go to Patient Education Activity  Goal: Patient/Family Education  Outcome: Progressing Towards Goal     Problem: General Medical Care Plan  Goal: *Vital signs within specified parameters  Outcome: Progressing Towards Goal  Goal: *Labs within defined limits  Outcome: Progressing Towards Goal  Goal: *Optimal pain control at patient's stated goal  Outcome: Progressing Towards Goal  Goal: *Skin integrity maintained  Outcome: Progressing Towards Goal  Goal: *Anxiety reduced or absent  Outcome: Progressing Towards Goal     Problem: Patient Education: Go to Patient Education Activity  Goal: Patient/Family Education  Outcome: Progressing Towards Goal

## 2022-11-27 NOTE — PROGRESS NOTES
Danyd of Shift Note    Bedside shift change report given to Jim Del Rosario RN(oncoming nurse) by Rekha Mercedes RN (offgoing nurse). Report included the following information SBAR, Kardex, Intake/Output, MAR, and Recent Results    Shift worked:  night     Shift summary and any significant changes:     Alert and oriented; vitally stable; mobilizing independently with slight assistance; complaining of left foot pain     Concerns for physician to address:       Zone phone for oncoming shift:         Activity:  Activity Level: Up with Assistance  Number times ambulated in hallways past shift: 0  Number of times OOB to chair past shift: 1    Cardiac:   Cardiac Monitoring: No           Access:  Current line(s): PIV     Genitourinary:   Urinary status: voiding    Respiratory:   O2 Device: None (Room air)  Chronic home O2 use?: NO  Incentive spirometer at bedside: N/A       GI:  Last Bowel Movement Date: 11/25/22  Current diet:  ADULT DIET Regular  Passing flatus: YES  Tolerating current diet: YES       Pain Management:   Patient states pain is manageable on current regimen: YES    Skin:  Rocael Score: 20  Interventions: float heels, increase time out of bed, PT/OT consult, and limit briefs    Patient Safety:  Fall Score:  Total Score: 2  Interventions: assistive device (walker, cane, etc), gripper socks, pt to call before getting OOB, and stay with me (per policy)  High Fall Risk: Yes    Length of Stay:  Expected LOS: 3d 2h  Actual LOS: 4      Rekha Mercedes RN

## 2022-11-27 NOTE — PROGRESS NOTES
adEnd of Shift Note    Bedside shift change report given to ALEXUS Mcduffie(oncoming nurse) by Zahira Alexander RN (offgoing nurse). Report included the following information SBAR, Kardex, Intake/Output, MAR, and Recent Results    Shift worked:  7-7     Shift summary and any significant changes:           Concerns for physician to address:       Zone phone for oncoming shift:  [phone]       Activity:  Activity Level: Up with Assistance  Number times ambulated in hallways past shift: 0  Number of times OOB to chair past shift: 1    Cardiac:   Cardiac Monitoring: No           Access:  Current line(s): PIV     Genitourinary:   Urinary status: voiding    Respiratory:   O2 Device: None (Room air)  Chronic home O2 use?: NO  Incentive spirometer at bedside: N/A       GI:  Last Bowel Movement Date: 11/25/22  Current diet:  ADULT DIET Regular  Passing flatus: YES  Tolerating current diet: YES       Pain Management:   Patient states pain is manageable on current regimen: YES    Skin:  Rocael Score: 20  Interventions: float heels, increase time out of bed, PT/OT consult, and limit briefs    Patient Safety:  Fall Score:  Total Score: 2  Interventions: assistive device (walker, cane, etc), gripper socks, pt to call before getting OOB, and stay with me (per policy)  High Fall Risk: Yes    Length of Stay:  Expected LOS: 3d 2h  Actual LOS: 4      Zahira Alexander RN

## 2022-11-27 NOTE — PROGRESS NOTES
Problem: Falls - Risk of  Goal: *Absence of Falls  Description: Document [place] Fall Risk and appropriate interventions in the flowsheet.   Outcome: Progressing Towards Goal  Note: Fall Risk Interventions:  Mobility Interventions: Utilize walker, cane, or other assistive device, PT Consult for assist device competence, PT Consult for mobility concerns, Patient to call before getting OOB         Medication Interventions: Teach patient to arise slowly, Patient to call before getting OOB    Elimination Interventions: Call light in reach              Problem: Patient Education: Go to Patient Education Activity  Goal: Patient/Family Education  Outcome: Progressing Towards Goal     Problem: General Medical Care Plan  Goal: *Optimal pain control at patient's stated goal  Outcome: Progressing Towards Goal

## 2022-11-28 ENCOUNTER — APPOINTMENT (OUTPATIENT)
Dept: GENERAL RADIOLOGY | Age: 37
End: 2022-11-28
Attending: INTERNAL MEDICINE
Payer: MEDICAID

## 2022-11-28 ENCOUNTER — APPOINTMENT (OUTPATIENT)
Dept: NON INVASIVE DIAGNOSTICS | Age: 37
End: 2022-11-28
Attending: NURSE PRACTITIONER
Payer: MEDICAID

## 2022-11-28 LAB
ALBUMIN SERPL-MCNC: 3.6 G/DL (ref 3.5–5)
ANION GAP SERPL CALC-SCNC: 6 MMOL/L (ref 5–15)
BASOPHILS # BLD: 0.1 K/UL (ref 0–0.1)
BASOPHILS NFR BLD: 1 % (ref 0–1)
BUN SERPL-MCNC: 16 MG/DL (ref 6–20)
BUN/CREAT SERPL: 21 (ref 12–20)
CALCIUM SERPL-MCNC: 9.2 MG/DL (ref 8.5–10.1)
CHLORIDE SERPL-SCNC: 110 MMOL/L (ref 97–108)
CK SERPL-CCNC: 143 U/L (ref 39–308)
CO2 SERPL-SCNC: 23 MMOL/L (ref 21–32)
CREAT SERPL-MCNC: 0.75 MG/DL (ref 0.7–1.3)
DATE LAST DOSE: ABNORMAL
DIFFERENTIAL METHOD BLD: ABNORMAL
ECHO AO ROOT DIAM: 3.2 CM
ECHO AO ROOT INDEX: 1.62 CM/M2
ECHO AV AREA PEAK VELOCITY: 2.9 CM2
ECHO AV AREA VTI: 3.1 CM2
ECHO AV AREA/BSA PEAK VELOCITY: 1.5 CM2/M2
ECHO AV AREA/BSA VTI: 1.6 CM2/M2
ECHO AV MEAN GRADIENT: 6 MMHG
ECHO AV MEAN VELOCITY: 1.1 M/S
ECHO AV PEAK GRADIENT: 10 MMHG
ECHO AV PEAK VELOCITY: 1.6 M/S
ECHO AV VELOCITY RATIO: 0.94
ECHO AV VTI: 26.7 CM
ECHO LA DIAMETER INDEX: 1.37 CM/M2
ECHO LA DIAMETER: 2.7 CM
ECHO LA TO AORTIC ROOT RATIO: 0.84
ECHO LV E' LATERAL VELOCITY: 10 CM/S
ECHO LV E' SEPTAL VELOCITY: 10 CM/S
ECHO LV EDV A4C: 141 ML
ECHO LV EDV INDEX A4C: 72 ML/M2
ECHO LV EJECTION FRACTION A4C: 55 %
ECHO LV ESV A4C: 64 ML
ECHO LV ESV INDEX A4C: 32 ML/M2
ECHO LV FRACTIONAL SHORTENING: 33 % (ref 28–44)
ECHO LV INTERNAL DIMENSION DIASTOLE INDEX: 2.18 CM/M2
ECHO LV INTERNAL DIMENSION DIASTOLIC: 4.3 CM (ref 4.2–5.9)
ECHO LV INTERNAL DIMENSION SYSTOLIC INDEX: 1.47 CM/M2
ECHO LV INTERNAL DIMENSION SYSTOLIC: 2.9 CM
ECHO LV IVSD: 1 CM (ref 0.6–1)
ECHO LV MASS 2D: 152.6 G (ref 88–224)
ECHO LV MASS INDEX 2D: 77.4 G/M2 (ref 49–115)
ECHO LV POSTERIOR WALL DIASTOLIC: 1.1 CM (ref 0.6–1)
ECHO LV RELATIVE WALL THICKNESS RATIO: 0.51
ECHO LVOT AREA: 3.1 CM2
ECHO LVOT AV VTI INDEX: 0.99
ECHO LVOT DIAM: 2 CM
ECHO LVOT MEAN GRADIENT: 5 MMHG
ECHO LVOT PEAK GRADIENT: 9 MMHG
ECHO LVOT PEAK VELOCITY: 1.5 M/S
ECHO LVOT STROKE VOLUME INDEX: 41.9 ML/M2
ECHO LVOT SV: 82.6 ML
ECHO LVOT VTI: 26.3 CM
ECHO MV A VELOCITY: 0.73 M/S
ECHO MV AREA VTI: 3.7 CM2
ECHO MV E DECELERATION TIME (DT): 128.8 MS
ECHO MV E VELOCITY: 0.83 M/S
ECHO MV E/A RATIO: 1.14
ECHO MV E/E' LATERAL: 8.3
ECHO MV E/E' RATIO (AVERAGED): 8.3
ECHO MV E/E' SEPTAL: 8.3
ECHO MV LVOT VTI INDEX: 0.84
ECHO MV MAX VELOCITY: 1 M/S
ECHO MV MEAN GRADIENT: 2 MMHG
ECHO MV MEAN VELOCITY: 0.7 M/S
ECHO MV PEAK GRADIENT: 4 MMHG
ECHO MV VTI: 22.1 CM
ECHO PV MAX VELOCITY: 1.2 M/S
ECHO PV PEAK GRADIENT: 6 MMHG
ECHO RV INTERNAL DIMENSION: 3.5 CM
ECHO RV TAPSE: 2.6 CM (ref 1.7–?)
EOSINOPHIL # BLD: 0.4 K/UL (ref 0–0.4)
EOSINOPHIL NFR BLD: 4 % (ref 0–7)
ERYTHROCYTE [DISTWIDTH] IN BLOOD BY AUTOMATED COUNT: 14 % (ref 11.5–14.5)
GLUCOSE SERPL-MCNC: 142 MG/DL (ref 65–100)
HCT VFR BLD AUTO: 43.9 % (ref 36.6–50.3)
HGB BLD-MCNC: 14.7 G/DL (ref 12.1–17)
IMM GRANULOCYTES # BLD AUTO: 0.1 K/UL (ref 0–0.04)
IMM GRANULOCYTES NFR BLD AUTO: 1 % (ref 0–0.5)
LYMPHOCYTES # BLD: 2.5 K/UL (ref 0.8–3.5)
LYMPHOCYTES NFR BLD: 23 % (ref 12–49)
MCH RBC QN AUTO: 30.9 PG (ref 26–34)
MCHC RBC AUTO-ENTMCNC: 33.5 G/DL (ref 30–36.5)
MCV RBC AUTO: 92.4 FL (ref 80–99)
MONOCYTES # BLD: 0.7 K/UL (ref 0–1)
MONOCYTES NFR BLD: 6 % (ref 5–13)
NEUTS SEG # BLD: 7.2 K/UL (ref 1.8–8)
NEUTS SEG NFR BLD: 65 % (ref 32–75)
NRBC # BLD: 0 K/UL (ref 0–0.01)
NRBC BLD-RTO: 0 PER 100 WBC
PHOSPHATE SERPL-MCNC: 3.7 MG/DL (ref 2.6–4.7)
PLATELET # BLD AUTO: 343 K/UL (ref 150–400)
PMV BLD AUTO: 9.7 FL (ref 8.9–12.9)
POTASSIUM SERPL-SCNC: 3.9 MMOL/L (ref 3.5–5.1)
RBC # BLD AUTO: 4.75 M/UL (ref 4.1–5.7)
REPORTED DOSE,DOSE: ABNORMAL UNITS
REPORTED DOSE/TIME,TMG: ABNORMAL
SODIUM SERPL-SCNC: 139 MMOL/L (ref 136–145)
VANCOMYCIN TROUGH SERPL-MCNC: 19.6 UG/ML (ref 5–10)
WBC # BLD AUTO: 10.9 K/UL (ref 4.1–11.1)

## 2022-11-28 PROCEDURE — 85025 COMPLETE CBC W/AUTO DIFF WBC: CPT

## 2022-11-28 PROCEDURE — 80069 RENAL FUNCTION PANEL: CPT

## 2022-11-28 PROCEDURE — 82550 ASSAY OF CK (CPK): CPT

## 2022-11-28 PROCEDURE — 74011000250 HC RX REV CODE- 250: Performed by: INTERNAL MEDICINE

## 2022-11-28 PROCEDURE — 94760 N-INVAS EAR/PLS OXIMETRY 1: CPT

## 2022-11-28 PROCEDURE — 74011250637 HC RX REV CODE- 250/637: Performed by: NURSE PRACTITIONER

## 2022-11-28 PROCEDURE — 74011000258 HC RX REV CODE- 258: Performed by: INTERNAL MEDICINE

## 2022-11-28 PROCEDURE — 80202 ASSAY OF VANCOMYCIN: CPT

## 2022-11-28 PROCEDURE — 36415 COLL VENOUS BLD VENIPUNCTURE: CPT

## 2022-11-28 PROCEDURE — 74011250637 HC RX REV CODE- 250/637: Performed by: INTERNAL MEDICINE

## 2022-11-28 PROCEDURE — 65270000046 HC RM TELEMETRY

## 2022-11-28 PROCEDURE — 71045 X-RAY EXAM CHEST 1 VIEW: CPT

## 2022-11-28 PROCEDURE — 74011250636 HC RX REV CODE- 250/636: Performed by: NURSE PRACTITIONER

## 2022-11-28 PROCEDURE — 93306 TTE W/DOPPLER COMPLETE: CPT

## 2022-11-28 PROCEDURE — 74011250636 HC RX REV CODE- 250/636: Performed by: INTERNAL MEDICINE

## 2022-11-28 RX ADMIN — MORPHINE SULFATE 2 MG: 2 INJECTION, SOLUTION INTRAMUSCULAR; INTRAVENOUS at 16:15

## 2022-11-28 RX ADMIN — ASPIRIN 81 MG: 81 TABLET, CHEWABLE ORAL at 08:16

## 2022-11-28 RX ADMIN — OXYCODONE 5 MG: 5 TABLET ORAL at 08:17

## 2022-11-28 RX ADMIN — ACETAMINOPHEN 650 MG: 325 TABLET ORAL at 20:12

## 2022-11-28 RX ADMIN — OXYCODONE 5 MG: 5 TABLET ORAL at 01:37

## 2022-11-28 RX ADMIN — VANCOMYCIN HYDROCHLORIDE 1250 MG: 10 INJECTION, POWDER, LYOPHILIZED, FOR SOLUTION INTRAVENOUS at 22:12

## 2022-11-28 RX ADMIN — VANCOMYCIN HYDROCHLORIDE 1250 MG: 10 INJECTION, POWDER, LYOPHILIZED, FOR SOLUTION INTRAVENOUS at 14:42

## 2022-11-28 RX ADMIN — MORPHINE SULFATE 2 MG: 2 INJECTION, SOLUTION INTRAMUSCULAR; INTRAVENOUS at 22:12

## 2022-11-28 RX ADMIN — OXYCODONE 5 MG: 5 TABLET ORAL at 14:04

## 2022-11-28 RX ADMIN — SODIUM CHLORIDE, PRESERVATIVE FREE 10 ML: 5 INJECTION INTRAVENOUS at 22:12

## 2022-11-28 RX ADMIN — SODIUM CHLORIDE 100 ML/HR: 9 INJECTION, SOLUTION INTRAVENOUS at 14:05

## 2022-11-28 RX ADMIN — AMLODIPINE BESYLATE 10 MG: 5 TABLET ORAL at 08:17

## 2022-11-28 RX ADMIN — CEFTRIAXONE 1 G: 1 INJECTION, POWDER, FOR SOLUTION INTRAMUSCULAR; INTRAVENOUS at 08:09

## 2022-11-28 RX ADMIN — SODIUM CHLORIDE, PRESERVATIVE FREE 10 ML: 5 INJECTION INTRAVENOUS at 05:05

## 2022-11-28 RX ADMIN — OXYCODONE 5 MG: 5 TABLET ORAL at 20:12

## 2022-11-28 RX ADMIN — HYDRALAZINE HYDROCHLORIDE 20 MG: 20 INJECTION INTRAMUSCULAR; INTRAVENOUS at 08:19

## 2022-11-28 RX ADMIN — THIAMINE HCL TAB 100 MG 200 MG: 100 TAB at 08:17

## 2022-11-28 RX ADMIN — MORPHINE SULFATE 2 MG: 2 INJECTION, SOLUTION INTRAMUSCULAR; INTRAVENOUS at 12:10

## 2022-11-28 RX ADMIN — VANCOMYCIN HYDROCHLORIDE 1250 MG: 10 INJECTION, POWDER, LYOPHILIZED, FOR SOLUTION INTRAVENOUS at 05:05

## 2022-11-28 RX ADMIN — MORPHINE SULFATE 2 MG: 2 INJECTION, SOLUTION INTRAMUSCULAR; INTRAVENOUS at 04:33

## 2022-11-28 NOTE — PROGRESS NOTES
Problem: Falls - Risk of  Goal: *Absence of Falls  Description: Document Buffy Fleming Fall Risk and appropriate interventions in the flowsheet.   Outcome: Progressing Towards Goal  Note: Fall Risk Interventions:  Mobility Interventions: Communicate number of staff needed for ambulation/transfer, Patient to call before getting OOB         Medication Interventions: Evaluate medications/consider consulting pharmacy, Patient to call before getting OOB, Teach patient to arise slowly    Elimination Interventions: Urinal in reach

## 2022-11-28 NOTE — PROGRESS NOTES
End of Shift Note    Bedside shift change report given to Tamara Friday, RN (oncoming nurse) by Valorie Yanes LPN (offgoing nurse). Report included the following information SBAR and Kardex    Shift worked:  day     Shift summary and any significant changes:     Patient ambulates with standby. Concerns for physician to address: No     Zone phone for oncoming shift:   [phone]       Activity:  Activity Level: Up with Assistance  Number times ambulated in hallways past shift: 0  Number of times OOB to chair past shift: 2    Cardiac:   Cardiac Monitoring: No           Access:  Current line(s): PIV     Genitourinary:   Urinary status: voiding    Respiratory:   O2 Device: None (Room air)  Chronic home O2 use?: NO  Incentive spirometer at bedside: YES       GI:  Last Bowel Movement Date: 11/28/22  Current diet:  ADULT DIET Regular  Passing flatus: YES  Tolerating current diet: YES       Pain Management:   Patient states pain is manageable on current regimen: YES    Skin:  Rocael Score: 19  Interventions: increase time out of bed    Patient Safety:  Fall Score:  Total Score: 1  Interventions: gripper socks and pt to call before getting OOB  High Fall Risk: Yes    Length of Stay:  Expected LOS: 3d 2h  Actual LOS: 5      Agustin Blanton LPN

## 2022-11-28 NOTE — PROGRESS NOTES
End of Shift Note    Bedside shift change report given to Mau MATT RN (oncoming nurse) by Jessa Gonzalez RN (offgoing nurse). Report included the following information SBAR, Kardex, ED Summary, Procedure Summary, Intake/Output, MAR, Accordion, Recent Results, and Med Rec Status    Shift worked:  7p-7a     Shift summary and any significant changes:          Concerns for physician to address:       Zone phone for oncoming shift:   [phone]       Activity:  Activity Level: Up with Assistance  Number times ambulated in hallways past shift: 0, ambulated in room  Number of times OOB to chair past shift: 0    Cardiac:   Cardiac Monitoring: No           Access:  Current line(s): PIV     Genitourinary:   Urinary status: voiding    Respiratory:   O2 Device: None (Room air)  Chronic home O2 use?: NO  Incentive spirometer at bedside: NO       GI:  Last Bowel Movement Date: 11/25/22  Current diet:  ADULT DIET Regular  Passing flatus: YES  Tolerating current diet: YES       Pain Management:   Patient states pain is manageable on current regimen: YES    Skin:  Rocael Score: 19  Interventions: float heels, increase time out of bed, and PT/OT consult    Patient Safety:  Fall Score:  Total Score: 2  Interventions: assistive device (walker, cane, etc), gripper socks, pt to call before getting OOB, and stay with me (per policy)  High Fall Risk: Yes    Length of Stay:  Expected LOS: 3d 2h  Actual LOS: 5      Reta Salas RN

## 2022-11-28 NOTE — PROGRESS NOTES
Pharmacy Antimicrobial Kinetic Dosing    Indication for Antimicrobials: SSTI, CAP     Current Regimen of Each Antimicrobial:  Vancomycin - pharmacy to dose; started ; day 4  Ceftriaxone 1g IV q24h, started , day 4    Previous Antimicrobial Therapy:    Goal Level: AUC: 400-600 mg/hr/Liter/day    Date Dose & Interval Measured (mcg/mL) Predicted AUC/HEATHER    0847 1g q 12 H 4.5 293/6.6   428 1,250 mg Q8H 19.6 538           Dosing calculator used: [employer] calculator    Significant Positive Cultures:    blood cx - NG x 5 days    Conditions for Dosing Consideration: None    Labs:  Recent Labs     22   CREA 0.75 0.68* 0.81   BUN 16 14 15     Recent Labs     22   WBC 10.9 9.5 13.3*     Temp (24hrs), Av.5 °F (36.9 °C), Min:98.1 °F (36.7 °C), Max:99 °F (37.2 °C)    Creatinine Clearance (mL/min):   CrCl (Adjusted Body Weight): 139.7 mL/min   If actual weight < IBW: CrCl (Actual Body Weight) 151.0    Impression/Plan:   Scr stable, WBC downtrending/WNL, Afebrile  Continue Vancomycin 1,250mg IV Q8H for a projected AUC of 538 and trough of 15.2  Antimicrobial stop date pending     Pharmacy will follow daily and adjust medications as appropriate for renal function and/or serum levels.     Thank you,  MIGUEL A Maat

## 2022-11-28 NOTE — PROGRESS NOTES
Hospitalist Progress Note    NAME: Ozzie Bates. :  1985   MRN:  [id number]     Admit date: 2022    Today's date: 22    PCP: None      Anticipated discharge date:     Barriers:   Homeless     Assessment / Plan:    Rhabdomyolysis POA CPK 11,686 --> 5431 --->143  ? Myositis or rhabdomyolysis related lumbar paraspinous muscles changes POA  Passed out sleeping on concrete surface, woke in afternoon   Smoking/nasal cocaine and opiates   Numbness in feet and inability to move feet bilaterally  Aches in upper thighs and naveed mid back   MRIs with no evidence of diskitis or osteomyelitis    Elevated HS troponin 528 --> 328 POA suspect related to rhabdomyolysis  No CP. EKG incomplete RBBB with NSSTW changes  Echo EF 55-60%, LV normal.  No further cardiac work up     Atypical pneumonia vs edema POA  CXR Patchy bilateral interstitial opacities can be seen with pulmonary edema or  infection, including atypical/viral etiologies. No cough or SOB  Remains on RA  Rapid COVID-19 negative  Influenza A/b antigen negative  Respiratory PCR negative including influenza and COVID-19  Procalcitonin  4.38, ? Skin and soft tissue vs myositis  pBNP 778  Echo EF 55-60%, likely not CHF. Continue IV abx for now. Repeat CXR and PCT     Inability to move feet bilaterally with numbness POA  Bilateral foot pain R > L  Onset day prior to admit, after passing out on concrete floor  Able to lift legs, but cannot dorsiflex or plantar flex feet, no improvementt  Calf and thigh not tender or firm   No HA  DP and PT pulses remains 2+ and symmetric  CTA abdomen with LE runoff IMPRESSION  1.  6.3 x 4.5 cm enhancing left retroperitoneal mass/masses, likely adrenal in  origin. Further evaluation with adrenal mass protocol CT/MRI recommended. Lesion  is amenable to percutaneous biopsy. 2.  Patent bilateral inflow and outflow vessels. 3.  Normal three-vessel runoff bilaterally.   MRI C-spine Multilevel disc and facet degenerative change with congenital narrowing of the                   cervical canal.              There is no evidence of epidural abscess. There is moderate canal and severe right foraminal stenosis at C3-4. MRI thoracic spine   No evidence of epidural abscess. Normal MRI evaluation of the thoracic spine. MRI lumbar spine IMPRESSION  1. Large left adrenal mass incompletely evaluated either representing primary  adrenal tumor or metastatic disease. Pheochromocytoma is occluded. Laboratory  evaluation is recommended including CT of the chest abdomen pelvis to search for  primary  2. Patchy abnormal dorsal paraspinous muscle signal with confluent areas of  postcontrast enhancement. Possible etiologies include an infectious or  inflammatory etiology/myositis without drainable fluid collection. Tumor  infiltration cannot be excluded  3. A discrete epidural collection is not identified. No evidence of discitis  Etiology unclear  ? Bilateral calf compartment syndrome naveed with the rhabdomyolysis              Seems unlikely, spoke with orthopedics, no evidence of this at present              No evidence of spinal cord process                          Seen by NSGY in ED              Circulation intact              ? Brain imaging of any value              ? Peripheral nerve injury while immobile    B12 and TSH WNL  PT/OT consults  Neurology input appreciated. Weakness likely neuropathy/myositis from prolonged immobilization on hard surface. Follow up with neurology for further testing if his symptoms persist.       ? Left adrenal mass 6.3 x 4.5 cm enhancing POA  Noted incidentally on CTA scans and lumbar MRI  Left retroperitoneal mass/masses, likely adrenal in origin  Checking Urine and Plasma metanephrines  Will need percutanous biopdy once acute issues resolved  Presence of mass discussed with patient.    If metanephrine screen neg, will refer patient to Urology as outpatient. Essential HTN POA  PO norvasc  PRN hydralazine     Drug screen + for cocaine and opiates  Uses intranasal, denies IVDA     Tobacco use < 1 PPD  Requests nicotine patch     Overweight POA Body mass index is 26.29 kg/m². DVT prophylaxis with Lovenox     Code status: Full code  NOK:    Subjective:     Chief Complaint / Reason for Physician Visit  Follow up rhabdomyolysis, PNA, adrenal mass    Review of Systems:  Symptom Y/N Comments  Symptom Y/N Comments   Fever/Chills n   Chest Pain n    Poor Appetite    Edema     Cough n   Abdominal Pain n    Sputum    Joint Pain Y    SOB/HIGUERA n   Headache y    Nausea/vomit n   Tolerating PT/OT     Diarrhea n   Tolerating Diet y    Constipation    Other       Could NOT obtain due to:      Objective:     VITALS:   Last 24hrs VS reviewed since prior progress note.  Most recent are:  Patient Vitals for the past 24 hrs:   Temp Pulse Resp BP SpO2   11/28/22 1533 98.2 °F (36.8 °C) 81 18 134/76 99 %   11/28/22 1145 97.9 °F (36.6 °C) 90 18 139/81 99 %   11/28/22 1101 -- -- -- (!) 161/96 --   11/28/22 0728 98.2 °F (36.8 °C) 73 18 (!) 161/96 100 %   11/27/22 2311 98.1 °F (36.7 °C) 76 18 (!) 147/79 100 %   11/27/22 1950 98.2 °F (36.8 °C) 80 16 131/85 98 %         Intake/Output Summary (Last 24 hours) at 11/28/2022 1659  Last data filed at 11/28/2022 0943  Gross per 24 hour   Intake 4991.67 ml   Output 2850 ml   Net 2141.67 ml          Wt Readings from Last 12 Encounters:   11/28/22 80.7 kg (178 lb)   06/12/18 72 kg (158 lb 11.7 oz)   05/30/17 72.6 kg (160 lb)   04/22/16 75.8 kg (167 lb 2 oz)   12/10/13 75.5 kg (166 lb 6.4 oz)   07/31/13 69 kg (152 lb 1.9 oz)   12/26/12 71.7 kg (158 lb)   07/30/12 72.6 kg (160 lb)   07/09/12 72.6 kg (160 lb)   05/27/12 73.8 kg (162 lb 12.8 oz)   03/21/12 72 kg (158 lb 11.7 oz)   02/06/12 73.7 kg (162 lb 7.7 oz)       PHYSICAL EXAM:    I had a face to face encounter and independently examined this patient on 11/28/22 as outlined below:    General: WD, WN. Alert, uncomfortable appearing, no acute distress    EENT:  PERRL. Anicteric sclerae. MMM  Resp:  CTA bilaterally, no wheezing or rales. No accessory muscle use  CV:  Regular  rhythm,  No edema  GI:  Soft, Non distended, Non tender. +Bowel sounds, no rebound  MS:  Calves soft, not firm  Neurologic:  Alert and oriented X 3, normal speech, non focal motor exam  Psych:   Not anxious nor agitated  Skin:  Abrasions on hands with dry skin, abrasions on dorsal toes bilaterally    Reviewed most current lab test results and cultures  YES  Reviewed most current radiology test results   YES  Review and summation of old records today    NO  Reviewed patient's current orders and MAR    YES  PMH/SH reviewed - no change compared to H&P  ________________________________________________________________________  Care Plan discussed with:    Comments   Patient x    Family      RN x    Care Manager     Consultant                        Multidiciplinary team rounds were held today with , nursing, pharmacist and clinical coordinator. Patient's plan of care was discussed; medications were reviewed and discharge planning was addressed. ________________________________________________________________________      Comments   >50% of visit spent in counseling and coordination of care     ________________________________________________________________________  Brent Del Cid MD     Procedures: see electronic medical records for all procedures/Xrays and details which were not copied into this note but were reviewed prior to creation of Plan. LABS:  I reviewed today's most current labs and imaging studies.   Pertinent labs include:  Recent Labs     11/28/22 0428 11/27/22 0457 11/26/22 0448   WBC 10.9 9.5 13.3*   HGB 14.7 14.9 15.3   HCT 43.9 43.7 44.9    340 405*       Recent Labs     11/28/22 0428 11/27/22 0457 11/26/22 0448    139 136   K 3.9 4.0 4.3   * 107 104   CO2 23 25 25   * 146* 117*   BUN 16 14 15   CREA 0.75 0.68* 0.81   CA 9.2 9.5 9.5   PHOS 3.7 3.4 4.5   ALB 3.6 3.5 3.8

## 2022-11-29 LAB
ALBUMIN SERPL-MCNC: 3.6 G/DL (ref 3.5–5)
ANION GAP SERPL CALC-SCNC: 8 MMOL/L (ref 5–15)
BASOPHILS # BLD: 0.1 K/UL (ref 0–0.1)
BASOPHILS NFR BLD: 1 % (ref 0–1)
BNP SERPL-MCNC: 25 PG/ML
BUN SERPL-MCNC: 17 MG/DL (ref 6–20)
BUN/CREAT SERPL: 27 (ref 12–20)
CALCIUM SERPL-MCNC: 9.6 MG/DL (ref 8.5–10.1)
CHLORIDE SERPL-SCNC: 108 MMOL/L (ref 97–108)
CK SERPL-CCNC: 69 U/L (ref 39–308)
CO2 SERPL-SCNC: 23 MMOL/L (ref 21–32)
CREAT SERPL-MCNC: 0.64 MG/DL (ref 0.7–1.3)
CREAT UR-MCNC: 14.2 MG/DL
DIFFERENTIAL METHOD BLD: ABNORMAL
EOSINOPHIL # BLD: 0.4 K/UL (ref 0–0.4)
EOSINOPHIL NFR BLD: 4 % (ref 0–7)
ERYTHROCYTE [DISTWIDTH] IN BLOOD BY AUTOMATED COUNT: 14.3 % (ref 11.5–14.5)
GLUCOSE SERPL-MCNC: 114 MG/DL (ref 65–100)
HCT VFR BLD AUTO: 42.6 % (ref 36.6–50.3)
HGB BLD-MCNC: 14.2 G/DL (ref 12.1–17)
IMM GRANULOCYTES # BLD AUTO: 0.1 K/UL (ref 0–0.04)
IMM GRANULOCYTES NFR BLD AUTO: 1 % (ref 0–0.5)
LYMPHOCYTES # BLD: 2.7 K/UL (ref 0.8–3.5)
LYMPHOCYTES NFR BLD: 26 % (ref 12–49)
MCH RBC QN AUTO: 30.9 PG (ref 26–34)
MCHC RBC AUTO-ENTMCNC: 33.3 G/DL (ref 30–36.5)
MCV RBC AUTO: 92.8 FL (ref 80–99)
METANEPH/CREAT RATIO: 6.3 UG/MG CREAT (ref 0–1)
METANEPHS 24H UR-MCNC: 16 UG/L
MONOCYTES # BLD: 0.9 K/UL (ref 0–1)
MONOCYTES NFR BLD: 9 % (ref 5–13)
NEUTS SEG # BLD: 6.3 K/UL (ref 1.8–8)
NEUTS SEG NFR BLD: 59 % (ref 32–75)
NORMETANEPHRINE 24H UR-MCNC: 875 UG/L
NRBC # BLD: 0 K/UL (ref 0–0.01)
NRBC BLD-RTO: 0 PER 100 WBC
PHOSPHATE SERPL-MCNC: 4.4 MG/DL (ref 2.6–4.7)
PLATELET # BLD AUTO: 355 K/UL (ref 150–400)
PMV BLD AUTO: 9.8 FL (ref 8.9–12.9)
POTASSIUM SERPL-SCNC: 4 MMOL/L (ref 3.5–5.1)
PROCALCITONIN SERPL-MCNC: 0.16 NG/ML
RBC # BLD AUTO: 4.59 M/UL (ref 4.1–5.7)
SODIUM SERPL-SCNC: 139 MMOL/L (ref 136–145)
WBC # BLD AUTO: 10.5 K/UL (ref 4.1–11.1)

## 2022-11-29 PROCEDURE — 36415 COLL VENOUS BLD VENIPUNCTURE: CPT

## 2022-11-29 PROCEDURE — 74011250637 HC RX REV CODE- 250/637: Performed by: NURSE PRACTITIONER

## 2022-11-29 PROCEDURE — 74011000258 HC RX REV CODE- 258: Performed by: INTERNAL MEDICINE

## 2022-11-29 PROCEDURE — 74011000250 HC RX REV CODE- 250: Performed by: INTERNAL MEDICINE

## 2022-11-29 PROCEDURE — 85025 COMPLETE CBC W/AUTO DIFF WBC: CPT

## 2022-11-29 PROCEDURE — 80069 RENAL FUNCTION PANEL: CPT

## 2022-11-29 PROCEDURE — 74011250637 HC RX REV CODE- 250/637: Performed by: INTERNAL MEDICINE

## 2022-11-29 PROCEDURE — 84145 PROCALCITONIN (PCT): CPT

## 2022-11-29 PROCEDURE — 65270000046 HC RM TELEMETRY

## 2022-11-29 PROCEDURE — 97530 THERAPEUTIC ACTIVITIES: CPT

## 2022-11-29 PROCEDURE — 74011250636 HC RX REV CODE- 250/636: Performed by: INTERNAL MEDICINE

## 2022-11-29 PROCEDURE — 83880 ASSAY OF NATRIURETIC PEPTIDE: CPT

## 2022-11-29 PROCEDURE — 97116 GAIT TRAINING THERAPY: CPT

## 2022-11-29 PROCEDURE — 76937 US GUIDE VASCULAR ACCESS: CPT

## 2022-11-29 PROCEDURE — 82550 ASSAY OF CK (CPK): CPT

## 2022-11-29 PROCEDURE — 94760 N-INVAS EAR/PLS OXIMETRY 1: CPT

## 2022-11-29 RX ORDER — GABAPENTIN 100 MG/1
100 CAPSULE ORAL 3 TIMES DAILY
Status: DISCONTINUED | OUTPATIENT
Start: 2022-11-29 | End: 2022-12-02 | Stop reason: HOSPADM

## 2022-11-29 RX ADMIN — OXYCODONE 5 MG: 5 TABLET ORAL at 21:37

## 2022-11-29 RX ADMIN — SODIUM CHLORIDE, PRESERVATIVE FREE 10 ML: 5 INJECTION INTRAVENOUS at 21:37

## 2022-11-29 RX ADMIN — GABAPENTIN 100 MG: 100 CAPSULE ORAL at 21:37

## 2022-11-29 RX ADMIN — OXYCODONE 5 MG: 5 TABLET ORAL at 08:38

## 2022-11-29 RX ADMIN — SODIUM CHLORIDE, PRESERVATIVE FREE 10 ML: 5 INJECTION INTRAVENOUS at 05:36

## 2022-11-29 RX ADMIN — OXYCODONE 5 MG: 5 TABLET ORAL at 15:23

## 2022-11-29 RX ADMIN — OXYCODONE 5 MG: 5 TABLET ORAL at 02:21

## 2022-11-29 RX ADMIN — GABAPENTIN 100 MG: 100 CAPSULE ORAL at 15:23

## 2022-11-29 RX ADMIN — MORPHINE SULFATE 2 MG: 2 INJECTION, SOLUTION INTRAMUSCULAR; INTRAVENOUS at 17:24

## 2022-11-29 RX ADMIN — MORPHINE SULFATE 2 MG: 2 INJECTION, SOLUTION INTRAMUSCULAR; INTRAVENOUS at 20:25

## 2022-11-29 RX ADMIN — THIAMINE HCL TAB 100 MG 200 MG: 100 TAB at 08:38

## 2022-11-29 RX ADMIN — VANCOMYCIN HYDROCHLORIDE 1250 MG: 10 INJECTION, POWDER, LYOPHILIZED, FOR SOLUTION INTRAVENOUS at 05:37

## 2022-11-29 RX ADMIN — ZOLPIDEM TARTRATE 5 MG: 5 TABLET ORAL at 23:30

## 2022-11-29 RX ADMIN — SODIUM CHLORIDE, PRESERVATIVE FREE 10 ML: 5 INJECTION INTRAVENOUS at 14:00

## 2022-11-29 RX ADMIN — MORPHINE SULFATE 2 MG: 2 INJECTION, SOLUTION INTRAMUSCULAR; INTRAVENOUS at 05:36

## 2022-11-29 RX ADMIN — MORPHINE SULFATE 2 MG: 2 INJECTION, SOLUTION INTRAMUSCULAR; INTRAVENOUS at 23:30

## 2022-11-29 RX ADMIN — AMLODIPINE BESYLATE 10 MG: 5 TABLET ORAL at 08:38

## 2022-11-29 RX ADMIN — MORPHINE SULFATE 2 MG: 2 INJECTION, SOLUTION INTRAMUSCULAR; INTRAVENOUS at 10:24

## 2022-11-29 RX ADMIN — MORPHINE SULFATE 2 MG: 2 INJECTION, SOLUTION INTRAMUSCULAR; INTRAVENOUS at 13:30

## 2022-11-29 RX ADMIN — MORPHINE SULFATE 2 MG: 2 INJECTION, SOLUTION INTRAMUSCULAR; INTRAVENOUS at 01:46

## 2022-11-29 RX ADMIN — ASPIRIN 81 MG: 81 TABLET, CHEWABLE ORAL at 08:38

## 2022-11-29 RX ADMIN — CEFTRIAXONE 1 G: 1 INJECTION, POWDER, FOR SOLUTION INTRAMUSCULAR; INTRAVENOUS at 10:18

## 2022-11-29 RX ADMIN — ACETAMINOPHEN 650 MG: 325 TABLET ORAL at 20:25

## 2022-11-29 NOTE — PROGRESS NOTES
End of Shift Note    Bedside shift change report given to Boris Espinoza RN (oncoming nurse) by Ghulam Dooley LPN (offgoing nurse). Report included the following information SBAR and Kardex    Shift worked:  day     Shift summary and any significant changes:    Patient ambulated hallway with PT. Provider also started him on gabapentin today. Concerns for physician to address:  no     Zone phone for oncoming shift:   [phone]       Activity:  Activity Level: Up with Assistance  Number times ambulated in hallways past shift: 1  Number of times OOB to chair past shift: 1    Cardiac:   Cardiac Monitoring: No           Access:  Current line(s): PIV     Genitourinary:   Urinary status: voiding    Respiratory:   O2 Device: None (Room air)  Chronic home O2 use?: NO  Incentive spirometer at bedside: NO       GI:  Last Bowel Movement Date: 11/28/22  Current diet:  ADULT DIET Regular  Passing flatus: YES  Tolerating current diet: YES       Pain Management:   Patient states pain is manageable on current regimen: YES    Skin:  Rocael Score: 20  Interventions: float heels and increase time out of bed    Patient Safety:  Fall Score:  Total Score: 2  Interventions: gripper socks and pt to call before getting OOB  High Fall Risk: Yes    Length of Stay:  Expected LOS: 3d 2h  Actual LOS: 6      Lord Samuel LPN

## 2022-11-29 NOTE — PROGRESS NOTES
Spiritual Care Assessment/Progress Note  [facility]      NAME: Thania Webb MRN: [id number]  AGE: 42 y.o.  SEX: male  Alevism Affiliation: Bahai   Language: English     11/29/2022     Total Time (in minutes): 15     Spiritual Assessment begun in [facility] through conversation with:         [x]Patient        [] Family    [] Friend(s)        Reason for Consult: Initial/Spiritual assessment, patient floor     Spiritual beliefs: (Please include comment if needed)     [x] Identifies with a margarita tradition:         [] Supported by a margarita community:            [] Claims no spiritual orientation:           [] Seeking spiritual identity:                [] Adheres to an individual form of spirituality:           [] Not able to assess:                           Identified resources for coping:      [x] Prayer                               [] Music                  [] Guided Imagery     [x] Family/friends                 [] Pet visits     [] Devotional reading                         [] Unknown     [] Other:                                               Interventions offered during this visit: (See comments for more details)    Patient Interventions: Initial/Spiritual assessment, patient floor, Initial visit, Affirmation of emotions/emotional suffering, Normalization of emotional/spiritual concerns, Prayer (assurance of)           Plan of Care:     [x] Support spiritual and/or cultural needs    [] Support AMD and/or advance care planning process      [] Support grieving process   [] Coordinate Rites and/or Rituals    [] Coordination with community clergy   [] No spiritual needs identified at this time   [] Detailed Plan of Care below (See Comments)  [] Make referral to Music Therapy  [] Make referral to Pet Therapy     [] Make referral to Addiction services  [] Make referral to [facility]  [] Make referral to Spiritual Care Partner  [] No future visits requested        [x] Contact Spiritual Care for further referrals     Comments:  reviewed the patient's chart prior to the visit. Mr. Tiff Waterman was watching television when the  entered his room. He appeared to be in a positive mood. He shared he is a [employer] and has four sons. He is a father to his nephew as well. He share his love for his children and his family. He stated his brother  a few years ago therefore he is close with his nephew. He requested chocolate ice cream.  checked with the Tech to see if he is allowed ice cream in his diet. She stated he could have ice cream.  provided a presence, prayer, empathetic listening, and encouragement.  services are available 24 hours a day as requested. Rev. MOLLY Puente  [facility]   Paging Service [phone])

## 2022-11-29 NOTE — PROGRESS NOTES
Problem: Falls - Risk of  Goal: *Absence of Falls  Description: Document Shelia So Fall Risk and appropriate interventions in the flowsheet.   Outcome: Progressing Towards Goal  Note: Fall Risk Interventions:  Mobility Interventions: Assess mobility with egress test         Medication Interventions: Evaluate medications/consider consulting pharmacy, Teach patient to arise slowly    Elimination Interventions: Call light in reach, Patient to call for help with toileting needs

## 2022-11-29 NOTE — PROGRESS NOTES
Pharmacy Antimicrobial Kinetic Dosing    Indication for Antimicrobials: SSTI, CAP     Current Regimen of Each Antimicrobial:  Vancomycin - pharmacy to dose; started ; day 5  Ceftriaxone 1g IV q24h, started , day 5    Previous Antimicrobial Therapy:    Goal Level: AUC: 400-600 mg/hr/Liter/day    Date Dose & Interval Measured (mcg/mL) Predicted AUC/HEATHER    0847 1g q 12 H 4.5 293/6.6   428 1,250 mg Q8H 19.6 538           Dosing calculator used: Digital Caddies calculator    Significant Positive Cultures:    blood cx - NG x 5 days    Conditions for Dosing Consideration: None    Labs:  Recent Labs     22   CREA 0.64* 0.75 0.68*   BUN 17 16 14   PCT 0.16  --   --      Recent Labs     22   WBC 10.5 10.9 9.5     Temp (24hrs), Av.1 °F (36.7 °C), Min:97.7 °F (36.5 °C), Max:98.4 °F (36.9 °C)    Creatinine Clearance (mL/min):   CrCl (Adjusted Body Weight): 149.7 mL/min   If actual weight < IBW: CrCl (Actual Body Weight) 161.8    Impression/Plan:   Scr stable, WBC downtrending/WNL, Afebrile  Continue Vancomycin 1,250mg IV Q8H for a projected AUC of 538 and trough of 15.2  Antimicrobial stop date pending     Pharmacy will follow daily and adjust medications as appropriate for renal function and/or serum levels.     Thank you,  MIGUEL A Bryant

## 2022-11-29 NOTE — PROGRESS NOTES
End of Shift Note    Bedside shift change report given to Mau MATT RN (oncoming nurse) by Blas Arroyo RN (offgoing nurse). Report included the following information SBAR, Kardex, ED Summary, Procedure Summary, Intake/Output, MAR, Recent Results, and Med Rec Status    Shift worked:  7p-7a     Shift summary and any significant changes:          Concerns for physician to address:       Zone phone for oncoming shift:   [phone]       Activity:  Activity Level: Up with Assistance  Number times ambulated in hallways past shift: 0  Number of times OOB to chair past shift: 2    Cardiac:   Cardiac Monitoring: No           Access:  Current line(s): PIV     Genitourinary:   Urinary status: voiding    Respiratory:   O2 Device: None (Room air)  Chronic home O2 use?: NO  Incentive spirometer at bedside: NO       GI:  Last Bowel Movement Date: 11/28/22  Current diet:  ADULT DIET Regular  Passing flatus: YES  Tolerating current diet: YES       Pain Management:   Patient states pain is manageable on current regimen: YES    Skin:  Rocael Score: 19  Interventions: float heels, increase time out of bed, and PT/OT consult    Patient Safety:  Fall Score:  Total Score: 2  Interventions: assistive device (walker, cane, etc), gripper socks, pt to call before getting OOB, and stay with me (per policy)  High Fall Risk: Yes    Length of Stay:  Expected LOS: 3d 2h  Actual LOS: 6      Reta Saals RN

## 2022-11-29 NOTE — PROGRESS NOTES
Problem: Mobility Impaired (Adult and Pediatric)  Goal: *Acute Goals and Plan of Care (Insert Text)  Description: FUNCTIONAL STATUS PRIOR TO ADMISSION: pt was previously independent without device; unclear of full social hx    HOME SUPPORT PRIOR TO ADMISSION: ?homeless per hospitalist note; not fully clear    Physical Therapy Goals  Initiated 11/25/2022  1. Patient will transfer from bed to chair and chair to bed with modified independence using the least restrictive device within 7 day(s). 2.  Patient will perform sit to stand with modified independence within 7 day(s). 3.  Patient will ambulate with modified independence for 150 feet with the least restrictive device within 7 day(s). 4.  Patient will ascend/descend 12 stairs with handrail(s) with modified independence within 7 day(s). Outcome: Progressing Towards Goal   PHYSICAL THERAPY TREATMENT  Patient: Kraig Mcpherson (41 y.o. male)  Date: 11/29/2022  Diagnosis: Rhabdomyolysis [M62.82] <principal problem not specified>      Precautions:    Chart, physical therapy assessment, plan of care and goals were reviewed. ASSESSMENT  Patient continues with skilled PT services and is progressing towards goals. Pt was received seated upright upon arrival and agreeable to participate with therapy services. Reports ambulating in the room without use of assistive device however slightly unsteady due to his complaints of continued left LE numbness. Pt ambulated with axillary crutches this date and with good tolerance into the hallways to the orthopedic gym SBA with no loss of balance. Pt performed stair training, ascending/descending 4 steps x2, one trial with use of axillary crutches and another trial without use of assistive device. Pt performed well overall CGA with cues for sequencing with no safety concerns.  Pt was able to ambulate back to his room 600ft SBA without use of an assistive device, however noted with slight antalgia and decreased stance on his left LE secondary to his numbness. Educated pt on safety and fall prevention strategies with use of AD and pt receptive to education. Spoke with supervising PT and will sign off on therapy services as pt has met all goals and ambulating up ad jemima in his room without use of an AD. Current Level of Function Impacting Discharge (mobility/balance): independent with transfers, SBA with use of axillary crutches and no AD    Other factors to consider for discharge: independent PLOF, discharge placement? - homeless but has basements he sleeps in (unsure if it's his friends or abandoned basements per chart review)         PLAN :  Patient continues to benefit from skilled intervention to address the above impairments. Continue treatment per established plan of care. to address goals. Recommendation for discharge: (in order for the patient to meet his/her long term goals)  Physical therapy at least 2 days/week in the home     This discharge recommendation:  Has been made in collaboration with the attending provider and/or case management    IF patient discharges home will need the following DME: axillary crutches       SUBJECTIVE:   Patient stated I'm pretty stubborn I'll just keep walking, but I'll listen to what you have to say.     OBJECTIVE DATA SUMMARY:   Critical Behavior:  Neurologic State: Alert  Orientation Level: Oriented X4  Cognition: Follows commands, Appropriate for age attention/concentration  Safety/Judgement: Awareness of environment  Functional Mobility Training:  Bed Mobility:  Rolling:  (pt was seated upright upon arrival)     Scooting: Independent    Transfers:  Sit to Stand: Independent  Stand to Sit: Independent        Bed to Chair: Stand-by assistance     Balance:  Sitting: Intact  Standing: Intact; With support  Standing - Static: Good;Constant support (axillary crutches)  Standing - Dynamic : Fair;Good;Constant support (axillary crutches)  Ambulation/Gait Training:  Distance (ft): 1200 Feet (ft) (600ft x2)  Assistive Device: Gait belt;Walker, rolling  Ambulation - Level of Assistance: Stand-by assistance     Gait Abnormalities: Decreased step clearance    Stance: Left decreased  Speed/Katelyn: Pace decreased (<100 feet/min)  Step Length: Left shortened;Right shortened    Stairs:  Number of Stairs Trained: 8 (4 with use of crutches, 4 without AD)  Stairs - Level of Assistance: Contact guard assistance   Rail Use: Both    Pain Rating:  Pt did not quantify however reports left LE numbness    Activity Tolerance:   Good    After treatment patient left in no apparent distress:   Sitting in chair and Call bell within reach    COMMUNICATION/COLLABORATION:   The patients plan of care was discussed with: Registered nurse.      Julian Sheehan PTA   Time Calculation: 23 mins

## 2022-11-29 NOTE — PROGRESS NOTES
Hospitalist Progress Note    NAME: Vidya Mejia. :  1985   MRN:  [id number]     Admit date: 2022    Today's date: 22    PCP: None      Anticipated discharge date:     Barriers:   Homeless     Assessment / Plan:    Rhabdomyolysis POA CPK 11,686 --> 5431 --->143  ? Myositis or rhabdomyolysis related lumbar paraspinous muscles changes POA  Passed out sleeping on concrete surface, woke in afternoon   Smoking/nasal cocaine and opiates   Numbness in feet and inability to move feet bilaterally  Aches in upper thighs and naveed mid back   MRIs with no evidence of diskitis or osteomyelitis    Elevated HS troponin 528 --> 328 POA suspect related to rhabdomyolysis  No CP. EKG incomplete RBBB with NSSTW changes  Echo EF 55-60%, LV normal.  No further cardiac work up    CK has normalized. DC IVFs     Atypical pneumonia vs edema POA  CXR Patchy bilateral interstitial opacities can be seen with pulmonary edema or  infection, including atypical/viral etiologies. No cough or SOB  Remains on RA  Rapid COVID-19 negative  Influenza A/b antigen negative  Respiratory PCR negative including influenza and COVID-19  Procalcitonin  4.38, ? Skin and soft tissue vs myositis  pBNP 778  Echo EF 55-60%, likely not CHF. CXR clear lungs  PCT wnl  DC IV abx. No further work up planned     Inability to move feet bilaterally with numbness POA  Bilateral foot pain R > L  Onset day prior to admit, after passing out on concrete floor  Able to lift legs, but cannot dorsiflex or plantar flex feet, no improvementt  Calf and thigh not tender or firm   No HA  DP and PT pulses remains 2+ and symmetric  CTA abdomen with LE runoff IMPRESSION  1.  6.3 x 4.5 cm enhancing left retroperitoneal mass/masses, likely adrenal in  origin. Further evaluation with adrenal mass protocol CT/MRI recommended. Lesion  is amenable to percutaneous biopsy. 2.  Patent bilateral inflow and outflow vessels.   3.  Normal three-vessel runoff bilaterally. MRI C-spine               Multilevel disc and facet degenerative change with congenital narrowing of the                   cervical canal.              There is no evidence of epidural abscess. There is moderate canal and severe right foraminal stenosis at C3-4. MRI thoracic spine   No evidence of epidural abscess. Normal MRI evaluation of the thoracic spine. MRI lumbar spine IMPRESSION  1. Large left adrenal mass incompletely evaluated either representing primary  adrenal tumor or metastatic disease. Pheochromocytoma is occluded. Laboratory  evaluation is recommended including CT of the chest abdomen pelvis to search for  primary  2. Patchy abnormal dorsal paraspinous muscle signal with confluent areas of  postcontrast enhancement. Possible etiologies include an infectious or  inflammatory etiology/myositis without drainable fluid collection. Tumor  infiltration cannot be excluded  3. A discrete epidural collection is not identified. No evidence of discitis  Etiology unclear  ? Bilateral calf compartment syndrome naveed with the rhabdomyolysis              Seems unlikely, spoke with orthopedics, no evidence of this at present              No evidence of spinal cord process                          Seen by NSGY in ED              Circulation intact              ? Brain imaging of any value              ? Peripheral nerve injury while immobile    B12 and TSH WNL  PT/OT consults. Probably will need crutches. Try gabapentin 100 TID for persistent neuropathic pain  Neurology input appreciated. Weakness likely neuropathy/myositis from prolonged immobilization on hard surface. Follow up with neurology for further testing if his symptoms persist.       ?  Left adrenal mass 6.3 x 4.5 cm enhancing POA  Noted incidentally on CTA scans and lumbar MRI  Left retroperitoneal mass/masses, likely adrenal in origin  Checking Urine and Plasma metanephrines  Will need percutanous biopdy once acute issues resolved  Presence of mass discussed with patient. If metanephrine screen neg, will refer patient to Urology as outpatient. Essential HTN POA  PO norvasc  PRN hydralazine     Drug screen + for cocaine and opiates  Uses intranasal, denies IVDA     Tobacco use < 1 PPD  Requests nicotine patch     Overweight POA Body mass index is 26.29 kg/m². DVT prophylaxis with Lovenox     Code status: Full code  NOK:    Subjective:     Chief Complaint / Reason for Physician Visit  Follow up rhabdomyolysis, PNA, adrenal mass    Review of Systems:  Symptom Y/N Comments  Symptom Y/N Comments   Fever/Chills n   Chest Pain n    Poor Appetite    Edema     Cough n   Abdominal Pain n    Sputum    Joint Pain Y    SOB/HIGUERA n   Headache y    Nausea/vomit n   Tolerating PT/OT     Diarrhea n   Tolerating Diet y    Constipation    Other       Could NOT obtain due to:      Objective:     VITALS:   Last 24hrs VS reviewed since prior progress note.  Most recent are:  Patient Vitals for the past 24 hrs:   Temp Pulse Resp BP SpO2   11/29/22 1038 97.9 °F (36.6 °C) 90 16 121/73 100 %   11/29/22 0724 98.4 °F (36.9 °C) 80 16 130/80 96 %   11/28/22 2306 97.7 °F (36.5 °C) 83 20 (!) 142/81 100 %   11/28/22 1948 98.4 °F (36.9 °C) 90 18 136/80 100 %   11/28/22 1533 98.2 °F (36.8 °C) 81 18 134/76 99 %   11/28/22 1145 97.9 °F (36.6 °C) 90 18 139/81 99 %         Intake/Output Summary (Last 24 hours) at 11/29/2022 1111  Last data filed at 11/28/2022 1931  Gross per 24 hour   Intake 2208.33 ml   Output --   Net 2208.33 ml          Wt Readings from Last 12 Encounters:   11/28/22 80.7 kg (178 lb)   06/12/18 72 kg (158 lb 11.7 oz)   05/30/17 72.6 kg (160 lb)   04/22/16 75.8 kg (167 lb 2 oz)   12/10/13 75.5 kg (166 lb 6.4 oz)   07/31/13 69 kg (152 lb 1.9 oz)   12/26/12 71.7 kg (158 lb)   07/30/12 72.6 kg (160 lb)   07/09/12 72.6 kg (160 lb)   05/27/12 73.8 kg (162 lb 12.8 oz)   03/21/12 72 kg (158 lb 11.7 oz)   02/06/12 73.7 kg (162 lb 7.7 oz)       PHYSICAL EXAM:    I had a face to face encounter and independently examined this patient on 11/29/22 as outlined below:    General: WD, WN. Alert, uncomfortable appearing, no acute distress    EENT:  PERRL. Anicteric sclerae. MMM  Resp:  CTA bilaterally, no wheezing or rales. No accessory muscle use  CV:  Regular  rhythm,  No edema  GI:  Soft, Non distended, Non tender. +Bowel sounds, no rebound  MS:  Calves soft, not firm  Neurologic:  Alert and oriented X 3, normal speech, non focal motor exam  Psych:   Not anxious nor agitated  Skin:  Abrasions on hands with dry skin, abrasions on dorsal toes bilaterally    Reviewed most current lab test results and cultures  YES  Reviewed most current radiology test results   YES  Review and summation of old records today    NO  Reviewed patient's current orders and MAR    YES  PMH/ reviewed - no change compared to H&P  ________________________________________________________________________  Care Plan discussed with:    Comments   Patient x    Family      RN x    Care Manager     Consultant                        Multidiciplinary team rounds were held today with , nursing, pharmacist and clinical coordinator. Patient's plan of care was discussed; medications were reviewed and discharge planning was addressed. ________________________________________________________________________      Comments   >50% of visit spent in counseling and coordination of care     ________________________________________________________________________  Shady Galarza MD     Procedures: see electronic medical records for all procedures/Xrays and details which were not copied into this note but were reviewed prior to creation of Plan. LABS:  I reviewed today's most current labs and imaging studies.   Pertinent labs include:  Recent Labs     11/29/22 0227 11/28/22  0428 11/27/22  0457   WBC 10.5 10.9 9.5   HGB 14.2 14.7 14.9   HCT 42.6 43.9 43.7    343 340       Recent Labs     11/29/22  0227 11/28/22  0428 11/27/22  0457    139 139   K 4.0 3.9 4.0    110* 107   CO2 23 23 25   * 142* 146*   BUN 17 16 14   CREA 0.64* 0.75 0.68*   CA 9.6 9.2 9.5   PHOS 4.4 3.7 3.4   ALB 3.6 3.6 3.5

## 2022-11-29 NOTE — PROGRESS NOTES
Endocrinology Note    Discussed case over the phone with Dr Zavala today. Told him that pheochromocytoma needs to be ruled out and currently both plasma and urine metanephrines have been sent and are pending and tend to take 5-7 business days to return. I told him I would not biopsy the adrenal mass until we know the labs are normal but if they do come back then he will need surgical removal of this mass given its over 6 cm. Will be h appy to see in person if levels come back confirming pheo.      Roberto Enamorado

## 2022-11-30 ENCOUNTER — APPOINTMENT (OUTPATIENT)
Dept: CT IMAGING | Age: 37
End: 2022-11-30
Attending: NURSE PRACTITIONER
Payer: MEDICAID

## 2022-11-30 LAB
ALBUMIN SERPL-MCNC: 3.6 G/DL (ref 3.5–5)
ANION GAP SERPL CALC-SCNC: 5 MMOL/L (ref 5–15)
BACTERIA SPEC CULT: NORMAL
BACTERIA SPEC CULT: NORMAL
BUN SERPL-MCNC: 20 MG/DL (ref 6–20)
BUN/CREAT SERPL: 28 (ref 12–20)
CALCIUM SERPL-MCNC: 9.7 MG/DL (ref 8.5–10.1)
CHLORIDE SERPL-SCNC: 106 MMOL/L (ref 97–108)
CK SERPL-CCNC: 44 U/L (ref 39–308)
CO2 SERPL-SCNC: 27 MMOL/L (ref 21–32)
CORTIS AM PEAK SERPL-MCNC: 14.2 UG/DL (ref 4.3–22.45)
CREAT SERPL-MCNC: 0.71 MG/DL (ref 0.7–1.3)
GLUCOSE SERPL-MCNC: 139 MG/DL (ref 65–100)
PHOSPHATE SERPL-MCNC: 4.4 MG/DL (ref 2.6–4.7)
POTASSIUM SERPL-SCNC: 4.2 MMOL/L (ref 3.5–5.1)
SERVICE CMNT-IMP: NORMAL
SERVICE CMNT-IMP: NORMAL
SODIUM SERPL-SCNC: 138 MMOL/L (ref 136–145)

## 2022-11-30 PROCEDURE — 36415 COLL VENOUS BLD VENIPUNCTURE: CPT

## 2022-11-30 PROCEDURE — 74011000636 HC RX REV CODE- 636: Performed by: INTERNAL MEDICINE

## 2022-11-30 PROCEDURE — 82533 TOTAL CORTISOL: CPT

## 2022-11-30 PROCEDURE — 82550 ASSAY OF CK (CPK): CPT

## 2022-11-30 PROCEDURE — 74011250637 HC RX REV CODE- 250/637: Performed by: INTERNAL MEDICINE

## 2022-11-30 PROCEDURE — 74170 CT ABD WO CNTRST FLWD CNTRST: CPT

## 2022-11-30 PROCEDURE — 74011250636 HC RX REV CODE- 250/636: Performed by: INTERNAL MEDICINE

## 2022-11-30 PROCEDURE — 74011250637 HC RX REV CODE- 250/637: Performed by: NURSE PRACTITIONER

## 2022-11-30 PROCEDURE — 94760 N-INVAS EAR/PLS OXIMETRY 1: CPT

## 2022-11-30 PROCEDURE — 80069 RENAL FUNCTION PANEL: CPT

## 2022-11-30 PROCEDURE — 74011250636 HC RX REV CODE- 250/636: Performed by: NURSE PRACTITIONER

## 2022-11-30 PROCEDURE — 74011000250 HC RX REV CODE- 250: Performed by: INTERNAL MEDICINE

## 2022-11-30 PROCEDURE — 65270000046 HC RM TELEMETRY

## 2022-11-30 RX ORDER — DOXAZOSIN 2 MG/1
2 TABLET ORAL
Status: DISCONTINUED | OUTPATIENT
Start: 2022-11-30 | End: 2022-12-02 | Stop reason: HOSPADM

## 2022-11-30 RX ORDER — DEXAMETHASONE 0.5 MG/1
1 TABLET ORAL ONCE
Status: COMPLETED | OUTPATIENT
Start: 2022-11-30 | End: 2022-11-30

## 2022-11-30 RX ADMIN — MORPHINE SULFATE 2 MG: 2 INJECTION, SOLUTION INTRAMUSCULAR; INTRAVENOUS at 14:59

## 2022-11-30 RX ADMIN — IOPAMIDOL 100 ML: 755 INJECTION, SOLUTION INTRAVENOUS at 11:08

## 2022-11-30 RX ADMIN — MORPHINE SULFATE 2 MG: 2 INJECTION, SOLUTION INTRAMUSCULAR; INTRAVENOUS at 09:06

## 2022-11-30 RX ADMIN — MORPHINE SULFATE 2 MG: 2 INJECTION, SOLUTION INTRAMUSCULAR; INTRAVENOUS at 18:22

## 2022-11-30 RX ADMIN — THIAMINE HCL TAB 100 MG 200 MG: 100 TAB at 09:02

## 2022-11-30 RX ADMIN — OXYCODONE 5 MG: 5 TABLET ORAL at 20:12

## 2022-11-30 RX ADMIN — GABAPENTIN 100 MG: 100 CAPSULE ORAL at 16:15

## 2022-11-30 RX ADMIN — OXYCODONE 5 MG: 5 TABLET ORAL at 12:54

## 2022-11-30 RX ADMIN — ACETAMINOPHEN 650 MG: 325 TABLET ORAL at 06:53

## 2022-11-30 RX ADMIN — ASPIRIN 81 MG: 81 TABLET, CHEWABLE ORAL at 09:02

## 2022-11-30 RX ADMIN — DEXAMETHASONE 1 MG: 0.5 TABLET ORAL at 23:17

## 2022-11-30 RX ADMIN — GABAPENTIN 100 MG: 100 CAPSULE ORAL at 09:02

## 2022-11-30 RX ADMIN — SODIUM CHLORIDE, PRESERVATIVE FREE 10 ML: 5 INJECTION INTRAVENOUS at 14:16

## 2022-11-30 RX ADMIN — SODIUM CHLORIDE, PRESERVATIVE FREE 10 ML: 5 INJECTION INTRAVENOUS at 22:24

## 2022-11-30 RX ADMIN — OXYCODONE 5 MG: 5 TABLET ORAL at 06:53

## 2022-11-30 RX ADMIN — AMLODIPINE BESYLATE 10 MG: 5 TABLET ORAL at 09:01

## 2022-11-30 RX ADMIN — MORPHINE SULFATE 2 MG: 2 INJECTION, SOLUTION INTRAMUSCULAR; INTRAVENOUS at 22:14

## 2022-11-30 RX ADMIN — DOXAZOSIN 2 MG: 2 TABLET ORAL at 22:03

## 2022-11-30 RX ADMIN — GABAPENTIN 100 MG: 100 CAPSULE ORAL at 22:04

## 2022-11-30 NOTE — PROGRESS NOTES
End of Shift Note    Bedside shift change report given to Yevgeniy Hirsch (oncoming nurse) by Demarco Dietz RN (offgoing nurse). Report included the following information SBAR, Kardex, ED Summary, Procedure Summary, Intake/Output, MAR, Recent Results, and Med Rec Status    Shift worked:  7p-7a     Shift summary and any significant changes:          Concerns for physician to address:       Zone phone for oncoming shift:   [phone]       Activity:  Activity Level: Up with Assistance  Number times ambulated in hallways past shift: 0, ambulated in room  Number of times OOB to chair past shift: 0    Cardiac:   Cardiac Monitoring: No           Access:  Current line(s): PIV     Genitourinary:   Urinary status: voiding    Respiratory:   O2 Device: None (Room air)  Chronic home O2 use?: NO  Incentive spirometer at bedside: NO       GI:  Last Bowel Movement Date: 11/28/22  Current diet:  ADULT DIET Regular  Passing flatus: YES  Tolerating current diet: YES       Pain Management:   Patient states pain is manageable on current regimen: YES    Skin:  Rocael Score: 20  Interventions: float heels, increase time out of bed, and PT/OT consult    Patient Safety:  Fall Score:  Total Score: 2  Interventions: assistive device (walker, cane, etc), gripper socks, pt to call before getting OOB, and stay with me (per policy)  High Fall Risk: Yes    Length of Stay:  Expected LOS: 3d 2h  Actual LOS: 7      Reta Salas RN

## 2022-11-30 NOTE — PROGRESS NOTES
End of Shift Note    Bedside shift change report given to Alessandro Castellanos RN (oncoming nurse) by Nicole Dotson LPN (offgoing nurse). Report included the following information SBAR and Kardex    Shift worked:  days     Shift summary and any significant changes:     Cortisol drawn today. Labs due tomorrow morning. Concerns for physician to address:  no     Zone phone for oncoming shift:   [phone]       Activity:  Activity Level: Up with Assistance  Number times ambulated in hallways past shift: 0  Number of times OOB to chair past shift: 2    Cardiac:   Cardiac Monitoring: No           Access:  Current line(s): PIV     Genitourinary:   Urinary status: voiding    Respiratory:   O2 Device: None (Room air)  Chronic home O2 use?: NO  Incentive spirometer at bedside: NO       GI:  Last Bowel Movement Date: 11/29/22  Current diet:  ADULT DIET Regular  ADULT ORAL NUTRITION SUPPLEMENT Breakfast, Dinner; Standard High Calorie/High Protein  ADULT ORAL NUTRITION SUPPLEMENT Lunch; Frozen Supplement  Passing flatus: YES  Tolerating current diet: YES       Pain Management:   Patient states pain is manageable on current regimen: YES    Skin:  Rocael Score: 20  Interventions: increase time out of bed    Patient Safety:  Fall Score:  Total Score: 2  Interventions: assistive device (walker, cane, etc) and gripper socks  High Fall Risk: Yes    Length of Stay:  Expected LOS: 3d 2h  Actual LOS: 7      Kristi Hinojosa LPN

## 2022-11-30 NOTE — PROGRESS NOTES
Pt seen. Full consult note to follow.     Needs full adrenal panel including dexamethasone suppression test.   Dedicated adrenal CT    After that will see on office to discuss robotic adrenalectomy

## 2022-11-30 NOTE — PROGRESS NOTES
Comprehensive Nutrition Assessment    Type and Reason for Visit: Initial, RD nutrition re-screen/LOS    Nutrition Recommendations/Plan:   Continue regular diet  Ensure Plus BID, Magic cup daily  Please document % meals and supplements consumed in flowsheet I/O's under intake      Malnutrition Assessment:  Malnutrition Status:  No malnutrition (11/30/22 1512)        Nutrition Assessment:     Chart reviewed for LOS. Pt noted for rhabddodmyolysis, atypical pna vs aspiration pneumonitis, inability to move feet bilaterally with numbness and foot pain, retroperitoneal/adrenal mass, essential HTN, cocaine and opiate positive drug screen, no IVDA. Pt reports a great appetite and only wishes he could have more food. Pt agreed to double portions and trying supplements. Noted potential need to transfer to another hospital for adrenalectomy. MST negative for malnutrition risk factors. Will continue to monitor. Patient Vitals for the past 168 hrs:   % Diet Eaten   11/30/22 0939 76 - 100%   11/28/22 0943 76 - 100%     Wt Readings from Last 5 Encounters:   11/28/22 80.7 kg (178 lb)   06/12/18 72 kg (158 lb 11.7 oz)   05/30/17 72.6 kg (160 lb)   04/22/16 75.8 kg (167 lb 2 oz)   12/10/13 75.5 kg (166 lb 6.4 oz)   ]    Nutrition Related Findings:    Labs reviewed. Meds: decadron, miralax, thiamine, morphine, oxycodone. BM 11/29. Wound Type: None    Current Nutrition Intake & Therapies:  Average Meal Intake: %  Average Supplement Intake: None ordered  ADULT DIET Regular  ADULT ORAL NUTRITION SUPPLEMENT Breakfast, Dinner; Standard High Calorie/High Protein  ADULT ORAL NUTRITION SUPPLEMENT Lunch; Frozen Supplement    Anthropometric Measures:  Height: 5' 9\" (175.3 cm)  Ideal Body Weight (IBW): 160 lbs (73 kg)     Current Body Wt:  80.7 kg (178 lb), 111.3 % IBW. Bed scale  Current BMI (kg/m2): 26.3        Weight Adjustment: No adjustment                 BMI Category: Overweight (BMI 25.0-29. 9)    Estimated Daily Nutrient Needs:  Energy Requirements Based On: Formula  Weight Used for Energy Requirements: Current  Energy (kcal/day): 2230 kcals (BMR x 1. 3AF)  Weight Used for Protein Requirements: Current  Protein (g/day): 97g (1.2g/kg)  Method Used for Fluid Requirements: 1 ml/kcal  Fluid (ml/day): 2200mL    Nutrition Diagnosis:   Increased nutrient needs related to catabolic illness as evidenced by  (new adrenal mass)    Nutrition Interventions:   Food and/or Nutrient Delivery: Continue current diet, Start oral nutrition supplement  Nutrition Education/Counseling: No recommendations at this time  Coordination of Nutrition Care: Continue to monitor while inpatient       Goals:     Goals: PO intake 75% or greater, by next RD assessment       Nutrition Monitoring and Evaluation:   Behavioral-Environmental Outcomes: None identified  Food/Nutrient Intake Outcomes: Food and nutrient intake, Supplement intake  Physical Signs/Symptoms Outcomes: Biochemical data, GI status, Weight, Nutrition focused physical findings    Discharge Planning:    Continue current diet, Continue oral nutrition supplement    Mustapha Walker RD  Contact: [phone]
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
## 2022-11-30 NOTE — PROGRESS NOTES
Hospitalist Progress Note    NAME: Ozzie Bates. :  1985   MRN:  [id number]     Admit date: 2022    Today's date: 22    PCP: None      Anticipated discharge date:   Barriers:   Completion of test    Assessment / Plan:    Rhabdomyolysis POA CPK 11,686 --> 5431 --->143  ? Myositis or rhabdomyolysis related lumbar paraspinous muscles changes POA  Passed out sleeping on concrete surface, woke in afternoon   Smoking/nasal cocaine and opiates   Numbness in feet and inability to move feet bilaterally  Aches in upper thighs and naveed mid back   MRIs with no evidence of diskitis or osteomyelitis    Elevated HS troponin 528 --> 328 POA suspect related to rhabdomyolysis  No CP. EKG incomplete RBBB with NSSTW changes  Echo EF 55-60%, LV normal.  No further cardiac work up    CK has normalized. DC IVFs     Atypical pneumonia vs aspiration pneumonitis POA  CXR Patchy bilateral interstitial opacities can be seen with pulmonary edema or  infection, including atypical/viral etiologies. No cough or SOB  Remains on RA  Rapid COVID-19 negative  Influenza A/b antigen negative  Respiratory PCR negative including influenza and COVID-19  Procalcitonin  4.38, ? Skin and soft tissue vs myositis  pBNP 778  Echo EF 55-60%, likely not CHF. Possibly aspiration vs atypical PNA    Repeat CXR  clear lungs and Procalcitonin has normalized so will DC IV abx. No further work up planned. Not hypoxic     Inability to move feet bilaterally with numbness POA  Bilateral foot pain R > L  Onset day prior to admit, after passing out on concrete floor  Able to lift legs, but cannot dorsiflex or plantar flex feet, no improvementt  Calf and thigh not tender or firm   No HA  DP and PT pulses remains 2+ and symmetric  CTA abdomen with LE runoff IMPRESSION  1.  6.3 x 4.5 cm enhancing left retroperitoneal mass/masses, likely adrenal in  origin.  Further evaluation with adrenal mass protocol CT/MRI recommended. Lesion  is amenable to percutaneous biopsy. 2.  Patent bilateral inflow and outflow vessels. 3.  Normal three-vessel runoff bilaterally. MRI C-spine               Multilevel disc and facet degenerative change with congenital narrowing of the                   cervical canal.              There is no evidence of epidural abscess. There is moderate canal and severe right foraminal stenosis at C3-4. MRI thoracic spine   No evidence of epidural abscess. Normal MRI evaluation of the thoracic spine. MRI lumbar spine IMPRESSION  1. Large left adrenal mass incompletely evaluated either representing primary  adrenal tumor or metastatic disease. Pheochromocytoma is occluded. Laboratory  evaluation is recommended including CT of the chest abdomen pelvis to search for  primary  2. Patchy abnormal dorsal paraspinous muscle signal with confluent areas of  postcontrast enhancement. Possible etiologies include an infectious or  inflammatory etiology/myositis without drainable fluid collection. Tumor  infiltration cannot be excluded  3. A discrete epidural collection is not identified. No evidence of discitis  Etiology unclear  ? Bilateral calf compartment syndrome naveed with the rhabdomyolysis              Seems unlikely, spoke with orthopedics, no evidence of this at present              No evidence of spinal cord process                          Seen by NSGY in ED              Circulation intact              ? Brain imaging of any value              ? Peripheral nerve injury while immobile    B12 and TSH WNL  PT/OT consults. Probably will need crutches. Try gabapentin 100 TID for persistent neuropathic pain  Neurology input appreciated. Weakness likely neuropathy/myositis from prolonged immobilization on hard surface.   Follow up with neurology for further testing if his symptoms persist.       Left adrenal mass 6.3 x 4.5 cm enhancing POA  Noted incidentally on CTA scans and lumbar MRI  Left retroperitoneal mass/masses, likely adrenal in origin  Urine metanephrines have resulted. Plasma metanephrines still pending    Urology input appreciated. Repeat CT reviewed. Dexamethasone suppression test in AM. Will need outpatient robotic adrenalectomy. Discussed with Dr Theresa Silvestre (endocrine). Possibly pheo, will need to err on the side of caution and start the patient on alpha blocker. Will stop norvasc and start cardura 2mg QHS tonight. He will be able to formally consult tomorrow - will await his recs. Essential HTN POA  -stopping norvasc  -starting cardura     Drug screen + for cocaine and opiates  Uses intranasal, denies IVDA     Tobacco use < 1 PPD  Requests nicotine patch     Overweight POA Body mass index is 26.29 kg/m². DVT prophylaxis with Lovenox     Code status: Full code  NOK:    Subjective:     Chief Complaint / Reason for Physician Visit  Follow up rhabdomyolysis, PNA, adrenal mass    Review of Systems:  Symptom Y/N Comments  Symptom Y/N Comments   Fever/Chills n   Chest Pain n    Poor Appetite    Edema     Cough n   Abdominal Pain n    Sputum    Joint Pain Y    SOB/HIGUERA n   Headache y    Nausea/vomit n   Tolerating PT/OT     Diarrhea n   Tolerating Diet y    Constipation    Other       Could NOT obtain due to:      Objective:     VITALS:   Last 24hrs VS reviewed since prior progress note.  Most recent are:  Patient Vitals for the past 24 hrs:   Temp Pulse Resp BP SpO2   11/30/22 0716 97.5 °F (36.4 °C) 85 -- (!) 140/74 100 %   11/29/22 2330 98 °F (36.7 °C) 78 16 122/70 99 %   11/29/22 1925 97.7 °F (36.5 °C) 88 18 130/74 97 %   11/29/22 1521 97.9 °F (36.6 °C) 78 16 128/80 100 %   11/29/22 1038 97.9 °F (36.6 °C) 90 16 121/73 100 %       No intake or output data in the 24 hours ending 11/30/22 0857       Wt Readings from Last 12 Encounters:   11/28/22 80.7 kg (178 lb)   06/12/18 72 kg (158 lb 11.7 oz)   05/30/17 72.6 kg (160 lb)   04/22/16 75.8 kg (167 lb 2 oz)   12/10/13 75.5 kg (166 lb 6.4 oz)   07/31/13 69 kg (152 lb 1.9 oz)   12/26/12 71.7 kg (158 lb)   07/30/12 72.6 kg (160 lb)   07/09/12 72.6 kg (160 lb)   05/27/12 73.8 kg (162 lb 12.8 oz)   03/21/12 72 kg (158 lb 11.7 oz)   02/06/12 73.7 kg (162 lb 7.7 oz)       PHYSICAL EXAM:    I had a face to face encounter and independently examined this patient on 11/30/22 as outlined below:    General: WD, WN. Alert, uncomfortable appearing, no acute distress    EENT:  PERRL. Anicteric sclerae. MMM  Resp:  CTA bilaterally, no wheezing or rales. No accessory muscle use  CV:  Regular  rhythm,  No edema  GI:  Soft, Non distended, Non tender. +Bowel sounds, no rebound  MS:  Calves soft, not firm  Neurologic:  Alert and oriented X 3, normal speech, non focal motor exam  Psych:   Not anxious nor agitated  Skin:  Abrasions on hands with dry skin, abrasions on dorsal toes bilaterally    Reviewed most current lab test results and cultures  YES  Reviewed most current radiology test results   YES  Review and summation of old records today    NO  Reviewed patient's current orders and MAR    YES  PMH/ reviewed - no change compared to H&P  ________________________________________________________________________  Care Plan discussed with:    Comments   Patient x    Family      RN x    Care Manager     Consultant                        Multidiciplinary team rounds were held today with , nursing, pharmacist and clinical coordinator. Patient's plan of care was discussed; medications were reviewed and discharge planning was addressed. ________________________________________________________________________      Comments   >50% of visit spent in counseling and coordination of care     ________________________________________________________________________  Shauna Langford MD     Procedures: see electronic medical records for all procedures/Xrays and details which were not copied into this note but were reviewed prior to creation of Plan. LABS:  I reviewed today's most current labs and imaging studies.   Pertinent labs include:  Recent Labs     11/29/22 0227 11/28/22 0428   WBC 10.5 10.9   HGB 14.2 14.7   HCT 42.6 43.9    343       Recent Labs     11/30/22 0314 11/29/22 0227 11/28/22 0428    139 139   K 4.2 4.0 3.9    108 110*   CO2 27 23 23   * 114* 142*   BUN 20 17 16   CREA 0.71 0.64* 0.75   CA 9.7 9.6 9.2   PHOS 4.4 4.4 3.7   ALB 3.6 3.6 3.6

## 2022-12-01 LAB
ALBUMIN SERPL-MCNC: 3.6 G/DL (ref 3.5–5)
ANION GAP SERPL CALC-SCNC: 6 MMOL/L (ref 5–15)
BUN SERPL-MCNC: 20 MG/DL (ref 6–20)
BUN/CREAT SERPL: 25 (ref 12–20)
CALCIUM SERPL-MCNC: 9.6 MG/DL (ref 8.5–10.1)
CHLORIDE SERPL-SCNC: 104 MMOL/L (ref 97–108)
CK SERPL-CCNC: 47 U/L (ref 39–308)
CO2 SERPL-SCNC: 27 MMOL/L (ref 21–32)
CORTIS AM PEAK SERPL-MCNC: 0.8 UG/DL (ref 4.3–22.45)
CREAT SERPL-MCNC: 0.81 MG/DL (ref 0.7–1.3)
GLUCOSE SERPL-MCNC: 165 MG/DL (ref 65–100)
PHOSPHATE SERPL-MCNC: 3.1 MG/DL (ref 2.6–4.7)
POTASSIUM SERPL-SCNC: 4.5 MMOL/L (ref 3.5–5.1)
SODIUM SERPL-SCNC: 137 MMOL/L (ref 136–145)

## 2022-12-01 PROCEDURE — 74011250637 HC RX REV CODE- 250/637: Performed by: INTERNAL MEDICINE

## 2022-12-01 PROCEDURE — 94760 N-INVAS EAR/PLS OXIMETRY 1: CPT

## 2022-12-01 PROCEDURE — 36415 COLL VENOUS BLD VENIPUNCTURE: CPT

## 2022-12-01 PROCEDURE — 82550 ASSAY OF CK (CPK): CPT

## 2022-12-01 PROCEDURE — 65270000046 HC RM TELEMETRY

## 2022-12-01 PROCEDURE — 80069 RENAL FUNCTION PANEL: CPT

## 2022-12-01 PROCEDURE — 82533 TOTAL CORTISOL: CPT

## 2022-12-01 PROCEDURE — 74011000250 HC RX REV CODE- 250: Performed by: INTERNAL MEDICINE

## 2022-12-01 PROCEDURE — 74011250636 HC RX REV CODE- 250/636: Performed by: INTERNAL MEDICINE

## 2022-12-01 PROCEDURE — 99223 1ST HOSP IP/OBS HIGH 75: CPT | Performed by: INTERNAL MEDICINE

## 2022-12-01 RX ADMIN — SODIUM CHLORIDE, PRESERVATIVE FREE 10 ML: 5 INJECTION INTRAVENOUS at 16:46

## 2022-12-01 RX ADMIN — SODIUM CHLORIDE, PRESERVATIVE FREE 10 ML: 5 INJECTION INTRAVENOUS at 21:06

## 2022-12-01 RX ADMIN — OXYCODONE 5 MG: 5 TABLET ORAL at 16:46

## 2022-12-01 RX ADMIN — OXYCODONE 5 MG: 5 TABLET ORAL at 10:07

## 2022-12-01 RX ADMIN — MORPHINE SULFATE 2 MG: 2 INJECTION, SOLUTION INTRAMUSCULAR; INTRAVENOUS at 07:20

## 2022-12-01 RX ADMIN — GABAPENTIN 100 MG: 100 CAPSULE ORAL at 21:05

## 2022-12-01 RX ADMIN — MORPHINE SULFATE 2 MG: 2 INJECTION, SOLUTION INTRAMUSCULAR; INTRAVENOUS at 13:00

## 2022-12-01 RX ADMIN — OXYCODONE 5 MG: 5 TABLET ORAL at 03:17

## 2022-12-01 RX ADMIN — ASPIRIN 81 MG: 81 TABLET, CHEWABLE ORAL at 08:20

## 2022-12-01 RX ADMIN — THIAMINE HCL TAB 100 MG 200 MG: 100 TAB at 08:20

## 2022-12-01 RX ADMIN — GABAPENTIN 100 MG: 100 CAPSULE ORAL at 16:46

## 2022-12-01 RX ADMIN — OXYCODONE 5 MG: 5 TABLET ORAL at 23:28

## 2022-12-01 RX ADMIN — DOXAZOSIN 2 MG: 2 TABLET ORAL at 21:05

## 2022-12-01 RX ADMIN — MORPHINE SULFATE 2 MG: 2 INJECTION, SOLUTION INTRAMUSCULAR; INTRAVENOUS at 20:01

## 2022-12-01 RX ADMIN — GABAPENTIN 100 MG: 100 CAPSULE ORAL at 08:19

## 2022-12-01 RX ADMIN — SODIUM CHLORIDE, PRESERVATIVE FREE 10 ML: 5 INJECTION INTRAVENOUS at 05:10

## 2022-12-01 NOTE — PROGRESS NOTES
Hospitalist Progress Note    NAME: Migue Gauthier. :  1985   MRN:  [id number]     Admit date: 2022    Today's date: 22    PCP: None      Anticipated discharge date:   Barriers:   completion of work up and consults    Assessment / Plan:    Rhabdomyolysis POA CPK 11,686 --> 5431 --->143  ? Myositis or rhabdomyolysis related lumbar paraspinous muscles changes POA  Passed out sleeping on concrete surface, woke in afternoon   Smoking/nasal cocaine and opiates   Numbness in feet and inability to move feet bilaterally  Aches in upper thighs and naveed mid back   MRIs with no evidence of diskitis or osteomyelitis    Elevated HS troponin 528 --> 328 POA suspect related to rhabdomyolysis  No CP. EKG incomplete RBBB with NSSTW changes  Echo EF 55-60%, LV normal.  No further cardiac work up    CK has normalized. DC IVFs     Atypical pneumonia vs aspiration pneumonitis POA  CXR Patchy bilateral interstitial opacities can be seen with pulmonary edema or  infection, including atypical/viral etiologies. No cough or SOB  Remains on RA  Rapid COVID-19 negative  Influenza A/b antigen negative  Respiratory PCR negative including influenza and COVID-19  Procalcitonin  4.38, ? Skin and soft tissue vs myositis  pBNP 778  Echo EF 55-60%, likely not CHF. Possibly aspiration vs atypical PNA    Repeat CXR  clear lungs and Procalcitonin has normalized so will DC IV abx. No further work up planned. Not hypoxic     Inability to move feet bilaterally with numbness POA  Bilateral foot pain R > L  Onset day prior to admit, after passing out on concrete floor  Able to lift legs, but cannot dorsiflex or plantar flex feet, no improvementt  Calf and thigh not tender or firm   No HA  DP and PT pulses remains 2+ and symmetric  CTA abdomen with LE runoff IMPRESSION  1.  6.3 x 4.5 cm enhancing left retroperitoneal mass/masses, likely adrenal in  origin.  Further evaluation with adrenal mass protocol CT/MRI recommended. Lesion  is amenable to percutaneous biopsy. 2.  Patent bilateral inflow and outflow vessels. 3.  Normal three-vessel runoff bilaterally. MRI C-spine               Multilevel disc and facet degenerative change with congenital narrowing of the                   cervical canal.              There is no evidence of epidural abscess. There is moderate canal and severe right foraminal stenosis at C3-4. MRI thoracic spine   No evidence of epidural abscess. Normal MRI evaluation of the thoracic spine. MRI lumbar spine IMPRESSION  1. Large left adrenal mass incompletely evaluated either representing primary  adrenal tumor or metastatic disease. Pheochromocytoma is occluded. Laboratory  evaluation is recommended including CT of the chest abdomen pelvis to search for  primary  2. Patchy abnormal dorsal paraspinous muscle signal with confluent areas of  postcontrast enhancement. Possible etiologies include an infectious or  inflammatory etiology/myositis without drainable fluid collection. Tumor  infiltration cannot be excluded  3. A discrete epidural collection is not identified. No evidence of discitis  Etiology unclear  ? Bilateral calf compartment syndrome naveed with the rhabdomyolysis              Seems unlikely, spoke with orthopedics, no evidence of this at present              No evidence of spinal cord process                          Seen by NSGY in ED              Circulation intact              ? Brain imaging of any value              ? Peripheral nerve injury while immobile    B12 and TSH WNL  PT/OT consults. Probably will need crutches. Try gabapentin 100 TID for persistent neuropathic pain  Neurology input appreciated. Weakness likely neuropathy/myositis from prolonged immobilization on hard surface.   Follow up with neurology for further testing if his symptoms persist.       Left adrenal mass 6.3 x 4.5 cm enhancing POA  Noted incidentally on CTA scans and lumbar MRI  Left retroperitoneal mass/masses, likely adrenal in origin  Urine metanephrines have resulted. Plasma metanephrines still pending    Urology input appreciated. Repeat CT reviewed. Dexamethasone suppression test ordered for today but this is unlikely cushings  Will need outpatient robotic adrenalectomy. CM made follow up appt at [facility] Urology Department for 1/11/23 at 9 a.m. Discussed with Dr Aliya Ceja (endocrine). Possibly pheo, will need to err on the side of caution and start the patient on alpha blocker. Stopped norvasc and started cardura 2mg QHS, he seems to be tolerating this. BP okay. Awaiting formal consult and recs. Dc planning for tomorrow. Essential HTN POA  -stopping norvasc  -starting cardura     Drug screen + for cocaine and opiates  Uses intranasal, denies IVDA     Tobacco use < 1 PPD  Requests nicotine patch     Overweight POA Body mass index is 26.29 kg/m². DVT prophylaxis with Lovenox     Code status: Full code  NOK:    Subjective:     Chief Complaint / Reason for Physician Visit  Follow up rhabdomyolysis, PNA, adrenal mass    Review of Systems:  Symptom Y/N Comments  Symptom Y/N Comments   Fever/Chills n   Chest Pain n    Poor Appetite    Edema     Cough n   Abdominal Pain n    Sputum    Joint Pain Y    SOB/HIGUERA n   Headache y    Nausea/vomit n   Tolerating PT/OT     Diarrhea n   Tolerating Diet y    Constipation    Other       Could NOT obtain due to:      Objective:     VITALS:   Last 24hrs VS reviewed since prior progress note.  Most recent are:  Patient Vitals for the past 24 hrs:   Temp Pulse Resp BP SpO2   12/01/22 1136 98.6 °F (37 °C) 87 16 118/69 99 %   12/01/22 0743 97.7 °F (36.5 °C) 80 16 126/79 100 %   12/01/22 0331 98.1 °F (36.7 °C) 83 15 117/64 97 %   11/30/22 2321 98.4 °F (36.9 °C) (!) 104 16 127/80 --   11/30/22 2010 97.7 °F (36.5 °C) 90 16 128/81 98 %   11/30/22 1546 97.3 °F (36.3 °C) 80 16 137/86 100 %       No intake or output data in the 24 hours ending 12/01/22 1335       Wt Readings from Last 12 Encounters:   11/28/22 80.7 kg (178 lb)   06/12/18 72 kg (158 lb 11.7 oz)   05/30/17 72.6 kg (160 lb)   04/22/16 75.8 kg (167 lb 2 oz)   12/10/13 75.5 kg (166 lb 6.4 oz)   07/31/13 69 kg (152 lb 1.9 oz)   12/26/12 71.7 kg (158 lb)   07/30/12 72.6 kg (160 lb)   07/09/12 72.6 kg (160 lb)   05/27/12 73.8 kg (162 lb 12.8 oz)   03/21/12 72 kg (158 lb 11.7 oz)   02/06/12 73.7 kg (162 lb 7.7 oz)       PHYSICAL EXAM:    I had a face to face encounter and independently examined this patient on 12/01/22 as outlined below:    General: WD, WN. Alert, uncomfortable appearing, no acute distress    EENT:  PERRL. Anicteric sclerae. MMM  Resp:  CTA bilaterally, no wheezing or rales. No accessory muscle use  CV:  Regular  rhythm,  No edema  GI:  Soft, Non distended, Non tender. +Bowel sounds, no rebound  MS:  Calves soft, not firm  Neurologic:  Alert and oriented X 3, normal speech, non focal motor exam  Psych:   Not anxious nor agitated  Skin:  Abrasions on hands with dry skin, abrasions on dorsal toes bilaterally    Reviewed most current lab test results and cultures  YES  Reviewed most current radiology test results   YES  Review and summation of old records today    NO  Reviewed patient's current orders and MAR    YES  PMH/ reviewed - no change compared to H&P  ________________________________________________________________________  Care Plan discussed with:    Comments   Patient x    Family      RN x    Care Manager     Consultant                        Multidiciplinary team rounds were held today with , nursing, pharmacist and clinical coordinator. Patient's plan of care was discussed; medications were reviewed and discharge planning was addressed.      ________________________________________________________________________      Comments   >50% of visit spent in counseling and coordination of care ________________________________________________________________________  Susie Douglas MD     Procedures: see electronic medical records for all procedures/Xrays and details which were not copied into this note but were reviewed prior to creation of Plan. LABS:  I reviewed today's most current labs and imaging studies.   Pertinent labs include:  Recent Labs     11/29/22 0227   WBC 10.5   HGB 14.2   HCT 42.6          Recent Labs     12/01/22  0322 11/30/22 0314 11/29/22 0227    138 139   K 4.5 4.2 4.0    106 108   CO2 27 27 23   * 139* 114*   BUN 20 20 17   CREA 0.81 0.71 0.64*   CA 9.6 9.7 9.6   PHOS 3.1 4.4 4.4   ALB 3.6 3.6 3.6

## 2022-12-01 NOTE — CONSULTS
Endocrinology Consult    Asked by Karen Morin NP to see this patient for Questionable left adrenal mass 6.3 x 4.5 cm    I came by to see this patient this afternoon and he told me that he recalls 5-10 years ago having a kidney stone and getting a CT scan done and that there was a growth on one of his adrenal glands (can't recall which one) but was told that this was likely benign. He does not seek regular medical care so never had this further evaluated. He is currently homeless and uses multiple drugs including heroin, cocaine, marijuana and meth. He has 4 kids that range from 9-25 in age. He was admitted on 11/22/22 after passing out and when he woke up he was unable to movie his legs so he crawled up from the basement where he was staying and called EMS and was brought to the ER. He was found to have rhabdo with a CK > 10K and had a CT of his abd arterial with runoff that commented on a 6.3x4.5 cm left adrenal mass and this was also seen on his MRI of his lumbar spine. He was not on any meds prior to admission but was found to be hypertensive with BP in the 068-227N systolic and was started on amlodipine 5 mg daily and this was controlling his BP during his stay. He does endorse intermittent headaches and some episodes of palpitations and he sweats easily without any activity. Sometimes feels dizzy. He had a urine collected for metanephrines that showed:    Component      Latest Ref Rng & Units 11/24/2022           1:12 AM   Creatinine, ur      Not Estab. mg/dL 14.2   Normetanephrine urine      Undefined ug/L 875   Metanephrine urine      Undefined ug/L 16   Metaneph/Creat ratio      0.0 - 1.0 ug/mg Creat 6.3 (H)     His plasma metanephrines were drawn but are still pending. He was seen by urology yesterday and had a dedicated abd CT that showed: The left adrenal mass measures 7.2 x 6.3 x 3.5 cm. It contains central coarse calcifications and is centrally hypoenhancing. Contours are lobulated.  It slightly displaces the patent splenic vein and artery, and the patent left renal vein. I spoke with the radiologist, Dr. Patricia Chau, who read this CT and asked whether he thought this looked more like cancer or a pheo and he said it's difficult to tell but given the size over 6 cm this needs to be removed. He was seen by urology and given he has medicaid, they recommended he be seen by [facility] urology for surgical removal.  The ordered a dexamethasone suppression test to rule out Cushing's and his 8 am cortisol after receiving 1 mg of dex at 11pm last night was:    Component      Latest Ref Rng & Units 12/1/2022           8:09 AM   Cortisol, a.m.      4.30 - 22.45 ug/dL 0.8 (L)     Normal is <1.8 so this rules out Cushings. I spoke with Dr. Lele Harper about his case yesterday and recommended he be started on cardura 2 mg at bedtime and the amlodipine be stopped and this was done and his BP was 126/83 with a pulse of 92 this afternoon. He is anxious to be discharged as he has been in the hospital over a week and I explained the importance of ensuring we have proper follow up as he will need surgery to determine if this adrenal mass is a pheo or cancer. He states that he has a court date in January and may end up serving more [facility] time and recently was released from [facility] a few months ago. He would like to see his kids over [place] if possible. I discussed his case over the phone with Dr. Brandon Gallagher who has experience with adrenalectomy and is part of the [facility] surgery practice at [facility] and therefore accepts Medicaid and he said he would be willing to evaluate the patient for this surgery so I placed a consult for him to see the patient. I discussed with Dr. Ady Baker that I would like patient to be on alpha blockade with doxazosin for at least a week and then would add low dose beta blocker with metoprolol 25 mg 1/2 tab bid.   I advised the patient to avoid drugs but particularly cocaine and meth which could put him at risk for a hypertensive crisis if he has a pheo. Assessment/Plan:  1) Left adrenal mass: unclear if this is a pheo or cancer but given size is > 6 cm, it needs to be removed. Given his urine normetanephrine was 875 and high suspicious for pheo is > 900, I will err on the side of treating him as if he has a pheo and have already begun alpha blockade with doxazosin. I will then proceed with beta blockade after a week and will coordinate follow up with Dr. Magali Barr  - cont doxazosin 2 mg at bedtime  - follow up on pending plasma metanephrines  - surgery consult with Dr. Magali Barr  - I gave him my office phone number to contact so we can arrange follow up  - likely will be stable for discharge tomorrow from my perspective    Thanks for the consult. Please don't hesitate to send me a message through perfect serve with any questions or concerns. I spent 75 minutes of face to face time on his case and > 50% of the time was spent reviewing the chart and coordinating his care with Dr. Elan Chung and Dr. Brody Erazo.     Giovanni Price MD

## 2022-12-01 NOTE — PROGRESS NOTES
Transition of Care Plan  RUR: 4%   DISPOSITION:  Friend's home  Transport: medicaid cab vs tbd     CM made follow up appt at [facility] Urology Department for 1/11/23 at 9 a.m.  CM placed on AVS.    Carlton Pulido, MSW  Care Management, [facility]

## 2022-12-01 NOTE — PROGRESS NOTES
Problem: Falls - Risk of  Goal: *Absence of Falls  Description: Document Taylor Don Fall Risk and appropriate interventions in the flowsheet.   Outcome: Progressing Towards Goal  Note: Fall Risk Interventions:  Mobility Interventions: Assess mobility with egress test, Patient to call before getting OOB         Medication Interventions: Evaluate medications/consider consulting pharmacy    Elimination Interventions: Call light in reach    History of Falls Interventions: Evaluate medications/consider consulting pharmacy         Problem: Patient Education: Go to Patient Education Activity  Goal: Patient/Family Education  Outcome: Progressing Towards Goal     Problem: General Medical Care Plan  Goal: *Vital signs within specified parameters  Outcome: Progressing Towards Goal  Goal: *Labs within defined limits  Outcome: Progressing Towards Goal  Goal: *Optimal pain control at patient's stated goal  Outcome: Progressing Towards Goal  Goal: *Skin integrity maintained  Outcome: Progressing Towards Goal  Goal: *Anxiety reduced or absent  Outcome: Progressing Towards Goal     Problem: Patient Education: Go to Patient Education Activity  Goal: Patient/Family Education  Outcome: Progressing Towards Goal     Problem: Pain  Goal: *Control of Pain  Outcome: Progressing Towards Goal     Problem: Patient Education: Go to Patient Education Activity  Goal: Patient/Family Education  Outcome: Progressing Towards Goal

## 2022-12-01 NOTE — PROGRESS NOTES
End of Shift Note    Bedside shift change report given to Antonietta Ware RN (oncoming nurse) by Christel Golden RN (offgoing nurse). Report included the following information SBAR and Kardex    Shift worked:  night     Shift summary and any significant changes:     Vitally stable; alert and oriented; For cortisol test morning     Concerns for physician to address:       Zone phone for oncoming shift:          Activity:  Activity Level: Up with Assistance  Number times ambulated in hallways past shift: 0  Number of times OOB to chair past shift: 2    Cardiac:   Cardiac Monitoring: No           Access:  Current line(s): PIV     Genitourinary:   Urinary status: voiding    Respiratory:   O2 Device: None (Room air)  Chronic home O2 use?: NO  Incentive spirometer at bedside: NO       GI:  Last Bowel Movement Date: 11/29/22  Current diet:  ADULT DIET Regular  ADULT ORAL NUTRITION SUPPLEMENT Breakfast, Dinner; Standard High Calorie/High Protein  ADULT ORAL NUTRITION SUPPLEMENT Lunch; Frozen Supplement  Passing flatus: YES  Tolerating current diet: YES       Pain Management:   Patient states pain is manageable on current regimen: YES    Skin:  Rocael Score: 20  Interventions: increase time out of bed    Patient Safety:  Fall Score:  Total Score: 2  Interventions: assistive device (walker, cane, etc) and gripper socks  High Fall Risk: Yes    Length of Stay:  Expected LOS: 3d 2h  Actual LOS: 8      Rosamaria Aleman RN

## 2022-12-02 ENCOUNTER — TELEPHONE (OUTPATIENT)
Dept: ENDOCRINOLOGY | Age: 37
End: 2022-12-02

## 2022-12-02 VITALS
SYSTOLIC BLOOD PRESSURE: 128 MMHG | BODY MASS INDEX: 26.36 KG/M2 | DIASTOLIC BLOOD PRESSURE: 84 MMHG | WEIGHT: 178 LBS | OXYGEN SATURATION: 100 % | HEIGHT: 69 IN | TEMPERATURE: 97.5 F | RESPIRATION RATE: 18 BRPM | HEART RATE: 77 BPM

## 2022-12-02 LAB
ALBUMIN SERPL-MCNC: 3.6 G/DL (ref 3.5–5)
ANION GAP SERPL CALC-SCNC: 4 MMOL/L (ref 5–15)
BUN SERPL-MCNC: 21 MG/DL (ref 6–20)
BUN/CREAT SERPL: 31 (ref 12–20)
CALCIUM SERPL-MCNC: 9.3 MG/DL (ref 8.5–10.1)
CHLORIDE SERPL-SCNC: 107 MMOL/L (ref 97–108)
CK SERPL-CCNC: 30 U/L (ref 39–308)
CO2 SERPL-SCNC: 27 MMOL/L (ref 21–32)
CREAT SERPL-MCNC: 0.68 MG/DL (ref 0.7–1.3)
GLUCOSE SERPL-MCNC: 136 MG/DL (ref 65–100)
PHOSPHATE SERPL-MCNC: 3.8 MG/DL (ref 2.6–4.7)
POTASSIUM SERPL-SCNC: 3.9 MMOL/L (ref 3.5–5.1)
SODIUM SERPL-SCNC: 138 MMOL/L (ref 136–145)

## 2022-12-02 PROCEDURE — 94760 N-INVAS EAR/PLS OXIMETRY 1: CPT

## 2022-12-02 PROCEDURE — 82550 ASSAY OF CK (CPK): CPT

## 2022-12-02 PROCEDURE — 80069 RENAL FUNCTION PANEL: CPT

## 2022-12-02 PROCEDURE — 36415 COLL VENOUS BLD VENIPUNCTURE: CPT

## 2022-12-02 PROCEDURE — 74011250637 HC RX REV CODE- 250/637: Performed by: INTERNAL MEDICINE

## 2022-12-02 PROCEDURE — 99222 1ST HOSP IP/OBS MODERATE 55: CPT | Performed by: SURGERY

## 2022-12-02 PROCEDURE — 74011000250 HC RX REV CODE- 250: Performed by: INTERNAL MEDICINE

## 2022-12-02 RX ORDER — METOPROLOL TARTRATE 25 MG/1
12.5 TABLET, FILM COATED ORAL 2 TIMES DAILY
Qty: 30 TABLET | Refills: 1 | Status: SHIPPED | OUTPATIENT
Start: 2022-12-02

## 2022-12-02 RX ORDER — DOXAZOSIN 2 MG/1
2 TABLET ORAL
Qty: 30 TABLET | Refills: 1 | Status: SHIPPED | OUTPATIENT
Start: 2022-12-02 | End: 2022-12-02 | Stop reason: SDUPTHER

## 2022-12-02 RX ORDER — DOXAZOSIN 2 MG/1
2 TABLET ORAL
Qty: 30 TABLET | Refills: 1 | Status: SHIPPED | OUTPATIENT
Start: 2022-12-02 | End: 2023-01-31

## 2022-12-02 RX ADMIN — GABAPENTIN 100 MG: 100 CAPSULE ORAL at 09:02

## 2022-12-02 RX ADMIN — SODIUM CHLORIDE, PRESERVATIVE FREE 10 ML: 5 INJECTION INTRAVENOUS at 05:12

## 2022-12-02 RX ADMIN — OXYCODONE 5 MG: 5 TABLET ORAL at 05:34

## 2022-12-02 RX ADMIN — THIAMINE HCL TAB 100 MG 200 MG: 100 TAB at 09:02

## 2022-12-02 RX ADMIN — ASPIRIN 81 MG: 81 TABLET, CHEWABLE ORAL at 09:02

## 2022-12-02 NOTE — PROGRESS NOTES
DISCHARGE NOTE FROM [facility]    Patient determined to be stable for discharge by attending provider. I have reviewed the discharge instructions with the patient. They verbalized understanding and all questions were answered to their satisfaction. No complaints or further questions were expressed. Hard scripts for medications given to patient. Appropriate educational materials and medication side effect teaching were provided. PIV were removed prior to discharge. Patient did not discharge with any line, love, or drain.      Personal items and valuables accounted for at discharge by patient and/or family: YES    Post-op patient: No      June Ordonez RN

## 2022-12-02 NOTE — PROGRESS NOTES
2000 -Bedside report from [facility]. Wants pain med for numb feet. Morphine adm (alternating w oxy). 2350 - Oxy for complaint of foot pain. Has been up ad jemima in room. 80 - oxy for L foot pain, labs sent. - Bedside report to RN.

## 2022-12-02 NOTE — DISCHARGE INSTRUCTIONS
HOSPITALIST DISCHARGE INSTRUCTIONS    NAME: Migue Gauthier. :  1985   MRN:  [id number]     Date/Time:  2022 8:03 AM    ADMIT DATE: 2022   DISCHARGE DATE: 2022         It is important that you take the medication exactly as they are prescribed. Keep your medication in the bottles provided by the pharmacist and keep a list of the medication names, dosages, and times to be taken in your wallet. Do not take other medications without consulting your doctor. What to do at Home    Recommended diet:  Cardiac Diet    Recommended activity: Activity as tolerated      If you have questions regarding the hospital related prescriptions or hospital related issues please call [address] [place] [address] office at [phone]. You can always direct your questions to your primary care doctor if you are unable to reach your hospital physician; your PCP works as an extension of your hospital doctor just like your hospital doctor is an extension of your PCP for your time at the hospital [facility])    If you experience any of the following symptoms then please call your primary care physician or return to the emergency room if you cannot get hold of your doctor:    Fever, chills, nausea, vomiting, or persistent diarrhea  Worsening weakness or new problems with your speech or balance  Dark stools or visible blood in your stools  New Leg swelling or shortness of breath as these could be signs of a clot    Additional Instructions:    Continue to take your medications until your surgery. Avoid use of cocaine or methamphetamines as this can raise your heart rate and blood pressure, both of which we are trying to control           Information obtained by :  I understand that if any problems occur once I am at home I am to contact my physician. I understand and acknowledge receipt of the instructions indicated above. Physician's or R.N.'s Signature                                                                  Date/Time                                                                                                                                              Patient or Representative Signature

## 2022-12-02 NOTE — TELEPHONE ENCOUNTER
I spoke with Dr. Patito Sorto this afternoon prior to patient being discharged about his evaluation and he plans on operating on 12/7/22. I told him that I will coordinate with patient being started on beta blockade prior to the surgery. I tried to reach patient tonight but it went to voicemail and I left him a messages stating I would try to call him back in the morning to discuss the plan for surgery. I went ahead and sent the prescriptions for doxazosin 2 mg tabs to take 1 tab at bedtime and metoprolol 25 mg tabs to take 1/2 tab bid starting Sunday to his [facility] on [address] per his discussion with me when I saw him yesterday.

## 2022-12-02 NOTE — CONSULTS
Surgery Consult  Consulted by: Dr. Misty Rosa  PCP: None    Thank you for allowing me to participate in your patient's care. Please call me with any questions. Assessment:   Left adrenal mass approximately 7 cm. Possibly pheochromocytoma. Body mass index is 26.29 kg/m². Comorbid illicit drug use, hepatitis C. No known h/o heart disease or stroke. S/p no prior abdominal operations. Plan:   We discussed the rationale for left adrenalectomy. He has been started on alpha blockade. In a few days he will begin beta-blockade and we will try to get him to surgery as soon as possible so that he has not lost follow-up. We discussed the risk of surgery including bleeding, infection, injury to adjacent structures, and the risks of general anesthetic. The patient understands the risks; his questions were answered to the patient's satisfaction. We discussed the need to not use cocaine between now and the time of surgery. Subjective:      Sophie Bunch. is a 42 y.o. male who is seen in consultation at the request of Dr. Misty Rosa for left adrenal mass. The patient was admitted to the hospital after being found down. He said he could not move his legs. He was admitted with rhabdomyolysis. He has been treated. During work-up he was found to have a left adrenal mass. Work-up is underway but it is concerning for pheochromocytoma. He has been started on alpha blockade. Today he feels well and is anticipating being discharged. Objective:   Blood pressure 128/84, pulse 77, temperature 97.5 °F (36.4 °C), resp. rate 18, height 5' 9\" (1.753 m), weight 80.7 kg (178 lb), SpO2 100 %.   Temp (24hrs), Av.9 °F (36.6 °C), Min:97.5 °F (36.4 °C), Max:98.2 °F (36.8 °C)      Physical Exam:  PHYSICAL EXAM:  Gen:  [x]     A&O     [x]      No acute distress    [x]     non-toxic     []     ill apearing     []     Critical        HEENT:   [x]     anicteric    []      scleral icterus    [x]     moist mucosa []     dry mucosa    RESP:   [x]     CTA bilaterally, no wheezing/rhonchi/rales/crackles    []     wheezing     []     rhonchi     []     crackles     []     use of accessory muscles    CARD:  [x]     regular rate and rhythm     [x]     No murmurs/rubs/gallops    []     irregular rhythm     []     Murmur     []     Rubs     []     Gallops    ABD:    [x]  soft  [x]     non distended  [x]     non tender  [x]      NABS    SKIN:    [x]     normal      []Rashes      []Ulcers     EXT:  [x]      No CCE     []2+ pulses throughout    []      Clubbing     []Cyanosis     []Edema     []diminished pulses    NEUR:  [x]     Strength normal     []weakness   []LUE     []RUE     []LLE     []RLE    [x]     follows commands          PSYCH:   insight []Poor   [x]good                      []     depressed     []     anxious     []     agitated    Past Medical History:   Diagnosis Date    Hypertension      Past Surgical History:   Procedure Laterality Date    NEUROLOGICAL PROCEDURE UNLISTED      injection for a pinched nerve      No family history on file. Social History     Socioeconomic History    Marital status:    Tobacco Use    Smoking status: Every Day     Packs/day: 1.00     Types: Cigarettes    Smokeless tobacco: Former   Substance and Sexual Activity    Alcohol use: Yes     Comment: social    Drug use: No      Prior to Admission medications    Medication Sig Start Date End Date Taking? Authorizing Provider   doxazosin (CARDURA) 2 mg tablet Take 1 Tablet by mouth nightly for 60 days.  12/2/22 1/31/23 Yes Lidia Cabrera MD     ALLERGIES:  No Known Allergies    Review of Systems:  (unchecked were asked but negative)  Constitutional: [] Fever/chills   [] Sweats   [] Loss of appetite   [] Fatigue/weakness   [] Weight loss  Head & Neck:   [] Headaches   [] Visual loss   [] Hearing loss   [] Thyroid problems  Cardiovascular:   [] Stroke   [] Calf pain when walking   [] Chest pain   [] Rapid heart beat       [] Heart attack   [] Stents   [] Congestive heart failure   [] Leg swelling   [] Murmur  Respiratory:   [] Sleep apnea   [] Cough/congestion   [] Shortness of breath   [] Wheezing/asthma      [] COPD/emphysema  Gastrointestinal:   [] Frequent indigestion   [] Trouble swallowing   [] Nausea   [] Vomiting   [] Bloating   [] Abd pain       [] Diarrhea   [] Constipation   [] Blood in stool   [] Ulcers   [] Intestinal disease   [x] Hepatitis    Genitourinary:   [] Painful urination   [] Difficulty urinating   [] Frequent urination       [] Enlarged prostate   [] Vasectomy   [] Blood in urine   [] Dialysis  Musculoskeletal:  [x] Muscle aches   [] Back pain   [] Joint pain  Neurologic:    [] Seizures   [] Dizziness   [] Numbness  Hematologic:   [] Nosebleeds   [] Easy bruising   [] Anemia   [] Easy bleeding  Psychiatric:    [] Depression   [] Anxiety   [] Bipolar disorder   [] Schizophrenia                                  []     Unable to obtain  ROS due to  []     mental status change  []     sedated   []     intubated    LABS:  No results for input(s): WBC, HGB, HCT, PLT, HGBEXT, HCTEXT, PLTEXT in the last 72 hours. Recent Labs     12/02/22 0528 12/01/22 0322 11/30/22  0314    137 138   K 3.9 4.5 4.2    104 106   CO2 27 27 27   BUN 21* 20 20   CREA 0.68* 0.81 0.71   * 165* 139*   CA 9.3 9.6 9.7   PHOS 3.8 3.1 4.4     Recent Labs     12/02/22 0528 12/01/22 0322 11/30/22  0314   ALB 3.6 3.6 3.6     No results for input(s): INR, PTP, APTT, INREXT in the last 72 hours.       Signed By: Carter Daniel MD     December 2, 2022

## 2022-12-02 NOTE — PROGRESS NOTES
Transition of Care Plan  RUR: 6%   DISPOSITION:  Friend's home  Transport: medicaid cab vs tbd    CM acknowledged d/c order. CM met with pt at bedside to discuss. Pt's brother-in-law is coming to transport pt home. Care Management Interventions  PCP Verified by CM: No (no pcp)  Palliative Care Criteria Met (RRAT>21 & CHF Dx)?: No  Mode of Transport at Discharge:  Other (see comment) (brother-in-law)  Transition of Care Consult (CM Consult): Discharge Planning  MyChart Signup: No  Discharge Durable Medical Equipment: No  Health Maintenance Reviewed: Yes  Physical Therapy Consult: No  Occupational Therapy Consult: No  Speech Therapy Consult: No  Support Systems: Other Family Member(s)  Confirm Follow Up Transport: Family  The Patient and/or Patient Representative was Provided with a Choice of Provider and Agrees with the Discharge Plan?: Yes  Freedom of Choice List was Provided with Basic Dialogue that Supports the Patient's Individualized Plan of Care/Goals, Treatment Preferences and Shares the Quality Data Associated with the Providers?: Yes  [facility] Resource Information Provided?: No  Discharge Location  Patient Expects to be Discharged to[de-identified] [address]  Care Management, [address]

## 2022-12-02 NOTE — DISCHARGE SUMMARY
Hospitalist Discharge Summary     Patient ID:  Martita Salazar  [id number]  42 y.o.  1985    PCP on record: None    Admit date: 11/22/2022  Discharge date and time: 12/2/2022      DISCHARGE DIAGNOSIS:    Rhabdomyolysis POA CPK 11,686 --> 5431 --->143  Atypical pneumonia vs aspiration pneumonitis POA  Inability to move feet bilaterally with numbness POA  Bilateral foot pain R > L  Left adrenal mass 6.3 x 4.5 cm enhancing POA  Essential HTN POA  Drug screen + for cocaine and opiates  Tobacco use < 1 PPD      CONSULTATIONS:  IP CONSULT TO NEUROSURGERY  IP CONSULT TO GENERAL SURGERY  IP CONSULT TO NEPHROLOGY  IP CONSULT TO NEUROLOGY  IP CONSULT TO ENDOCRINOLOGY  IP CONSULT TO UROLOGY  IP CONSULT TO ORTHOPEDIC SURGERY    Excerpted HPI from H&P of Wesley Sams DO:  CHIEF COMPLAINT: \"I cannot move either foot x days\"     HISTORY OF PRESENT ILLNESS:     42 Y. O Male  Essential hypertension not on current Rx  Currently homeless, lives in people basements, sleeps on hard surfaces  Admits to passing out recently  Snorts morphine and cocaine, denies IVDA     Woke up this morning, moderate aching in his back  Legs \"would not work\", had to drag himself across ground to crawl              Scrapped up toes dragging his feet  Came to the ED as his feet \"will not move\"  No HA or SOB or cough or CP or abdominal pain  No fevers, N/V, diarrhea  ______________________________________________________________________  DISCHARGE SUMMARY/HOSPITAL COURSE:  for full details see H&P, daily progress notes, labs, consult notes. Rhabdomyolysis POA CPK 11,686 --> 5431 --->143  ?  Myositis or rhabdomyolysis related lumbar paraspinous muscles changes POA  Passed out sleeping on concrete surface, woke in afternoon              Smoking/nasal cocaine and opiates              Numbness in feet and inability to move feet bilaterally  Aches in upper thighs and naveed mid back              MRIs with no evidence of diskitis or osteomyelitis     Elevated HS troponin 528 --> 328 POA suspect related to rhabdomyolysis  No CP. EKG incomplete RBBB with NSSTW changes  Echo EF 55-60%, LV normal.  No further cardiac work up  CK has normalized. DC IVFs     Atypical pneumonia vs aspiration pneumonitis POA  CXR Patchy bilateral interstitial opacities can be seen with pulmonary edema or  infection, including atypical/viral etiologies. No cough or SOB  Remains on RA  Rapid COVID-19 negative  Influenza A/b antigen negative  Respiratory PCR negative including influenza and COVID-19  Procalcitonin  4.38, ? Skin and soft tissue vs myositis  pBNP 778  Echo EF 55-60%, likely not CHF. Possibly aspiration vs atypical PNA     Repeat CXR 11/29 clear lungs and Procalcitonin has normalized so will DC IV abx. No further work up planned. Not hypoxic     Inability to move feet bilaterally with numbness POA  Bilateral foot pain R > L  Onset day prior to admit, after passing out on concrete floor  Able to lift legs, but cannot dorsiflex or plantar flex feet, no improvementt  Calf and thigh not tender or firm   No HA  DP and PT pulses remains 2+ and symmetric  CTA abdomen with LE runoff IMPRESSION  1.  6.3 x 4.5 cm enhancing left retroperitoneal mass/masses, likely adrenal in  origin. Further evaluation with adrenal mass protocol CT/MRI recommended. Lesion  is amenable to percutaneous biopsy. 2.  Patent bilateral inflow and outflow vessels. 3.  Normal three-vessel runoff bilaterally. MRI C-spine               Multilevel disc and facet degenerative change with congenital narrowing of the                   cervical canal.              There is no evidence of epidural abscess. There is moderate canal and severe right foraminal stenosis at C3-4. MRI thoracic spine   No evidence of epidural abscess. Normal MRI evaluation of the thoracic spine.   MRI lumbar spine IMPRESSION  1. Large left adrenal mass incompletely evaluated either representing primary  adrenal tumor or metastatic disease. Pheochromocytoma is occluded. Laboratory  evaluation is recommended including CT of the chest abdomen pelvis to search for  primary  2. Patchy abnormal dorsal paraspinous muscle signal with confluent areas of  postcontrast enhancement. Possible etiologies include an infectious or  inflammatory etiology/myositis without drainable fluid collection. Tumor  infiltration cannot be excluded  3. A discrete epidural collection is not identified. No evidence of discitis  Etiology unclear  ? Bilateral calf compartment syndrome naveed with the rhabdomyolysis              Seems unlikely, spoke with orthopedics, no evidence of this at present              No evidence of spinal cord process                          Seen by NSGY in ED              Circulation intact              ? Brain imaging of any value              ? Peripheral nerve injury while immobile     B12 and TSH WNL  PT/OT consults. Probably will need crutches. Try gabapentin 100 TID for persistent neuropathic pain  Neurology input appreciated. Weakness likely neuropathy/myositis from prolonged immobilization on hard surface. Follow up with neurology for further testing if his symptoms persist.       Left adrenal mass 6.3 x 4.5 cm enhancing POA  Noted incidentally on CTA scans and lumbar MRI  Left retroperitoneal mass/masses, likely adrenal in origin  Urine metanephrines have resulted. Plasma metanephrines still pending     Urology input appreciated. Repeat CT reviewed. Dexamethasone suppression test ordered for today but this is unlikely cushings  Will need outpatient robotic adrenalectomy. CM made follow up appt at [facility] Urology Department for 1/11/23 at 9 a.m. Appreciate Dr Golria Cunningham help and input. Started cardura and patient is tolerating. Patient will be referred to Dr Ady Baker (Gen surgery) as outpatient. BB will be added prior to surgery.        Essential HTN POA  -starting cardura     Drug screen + for cocaine and opiates  Uses intranasal, denies IVDA. Counseled to avoid cocaine and amphetamines. Tobacco use < 1 PPD  nicotine patch    _______________________________________________________________________  Patient seen and examined by me on discharge day. Pertinent Findings:  Gen:    Not in distress  Chest: Clear lungs  CVS:   Regular rhythm. No edema  Abd:  Soft, not distended, not tender  Neuro:  Alert, Oriented x 4, grossly non focal exam  _______________________________________________________________________  DISCHARGE MEDICATIONS:   Discharge Medication List as of 12/2/2022 10:19 AM        START taking these medications    Details   doxazosin (CARDURA) 2 mg tablet Take 1 Tablet by mouth nightly for 60 days. , Print, Disp-30 Tablet, R-1           STOP taking these medications       HYDROcodone-acetaminophen (NORCO) 5-325 mg per tablet Comments:   Reason for Stopping:               My Recommended Diet, Activity, Wound Care, and follow-up labs are listed in the patient's Discharge Insturctions which I have personally completed and reviewed. Risk of deterioration: Low    Condition at Discharge:  Stable  _____________________________________________________________________    Disposition  Home with family, no needs  ____________________________________________________________________    Care Plan discussed with:   Patient, Family, RN, Care Manager, Consultant    ____________________________________________________________________    Code Status: Full Code  ____________________________________________________________________      Condition at Discharge:  Stable  _____________________________________________________________________  Follow up with:   PCP : None  Follow-up Information       Follow up With Specialties Details Why [address] [facility] Urology Department  Go on 1/11/2023 FOR ADRENAL  MASS; Your appt is 1/11/23 at 9 a.m. at the [address] [facility] [address], [address] [facility], [address] [facility] location with Dr. Belen simeon.  [address] [place] [address]   [phone]    Liya Melendez MD General Surgery, Surgery General, Colon and Rectal Surgery, Endocrinology Physician Schedule an appointment as soon as possible for a visit in 1 week(s)  [address] [facility] [address]  [address] [facility]      Steph James MD Endocrinology Physician Call As needed [address] [facility] [address] [facility]  Zaida Finger [phone]      None    None ([phone]) Patient stated that they have no PCP                  Total time in minutes spent coordinating this discharge (includes going over instructions, follow-up, prescriptions, and preparing report for sign off to her PCP) :  35 minutes    Signed:  Maine Velez MD

## 2022-12-05 ENCOUNTER — TELEPHONE (OUTPATIENT)
Dept: SURGERY | Age: 37
End: 2022-12-05

## 2022-12-05 NOTE — TELEPHONE ENCOUNTER
Pt called back stating he did  the medication and he took the first dose last night. He is asking if the surgery is still scheduled and if so what does he need to do. Explained message will be passed to Dr Dawit Diaz.

## 2022-12-05 NOTE — TELEPHONE ENCOUNTER
Spoke with patient informed him DOS 10/7/22 @ 6290, informed him he will be receiving a call from [facility] prior with further instructions. Express understanding.

## 2022-12-05 NOTE — TELEPHONE ENCOUNTER
I let him know over text today that he should hear from [facility] office whether the surgery is still on and the timing of the surgery.

## 2022-12-05 NOTE — TELEPHONE ENCOUNTER
LVM asking pt to call office or to text and let Dr Otilia Vargas know if he was able to  the medications. Callback number for office left.

## 2022-12-05 NOTE — TELEPHONE ENCOUNTER
**LATE ENTRY--PHONE CALL TOOK PLACE ON 12/4/22 AT 12PM**    I was finally able to reach patient and he said he had not picked up the doxazosin that was given to him on discharge on a printed prescription. I told him that I had sent this prescription electronically to take 1 tab at bedtime and to take metoprolol 1/2 tab twice daily to [facility] on [address] and he said he would try to get a ride to pick these up today and start taking them. I told him that Dr. Hellen Canales had scheduled his surgery for Wed 12/7/22 and he wasn't aware of this date and I told him that I need him to be on both of these meds to safely have the surgery. I asked him to text me that he had picked up the meds and he said he would do so. As of 8:45pm I had not heard from him and texted him to ask if he picked up his meds and he never replied. I will have my nurse, Marco A Baker, check in with him today to find out if he has picked up his meds or not as it may not be safe to proceed with surgery on Wednesday if he is not taking both of these meds.

## 2022-12-06 PROBLEM — D35.00 PHEOCHROMOCYTOMA: Status: ACTIVE | Noted: 2022-12-06

## 2022-12-06 PROBLEM — Z01.818 PREOPERATIVE CLEARANCE: Status: ACTIVE | Noted: 2022-12-06

## 2022-12-06 LAB
METANEPH FREE SERPL-MCNC: 44 PG/ML (ref 0–88)
NORMETANEPHRINE SERPL-MCNC: 2982.2 PG/ML (ref 0–210.1)

## 2022-12-07 NOTE — PROGRESS NOTES
Physician Progress Note      PATIENT:               Tegan Nichole  CSN #:                  [id number]  :                       1985  ADMIT DATE:       2022 8:29 PM  [address] DATE:        2022 10:28 AM  RESPONDING  PROVIDER #:        Dinesh Means MD          QUERY TEXT:    Patient admitted with rhabdo. Documentation reflects harpal (nephro pn). If possible, please document in the progress notes and discharge summary if harpal was: The medical record reflects the following:  Risk Factors: rhabdo, hx htn  Clinical Indicators: Nephro-HARPAL resolved. crea 1.40  Treatment: Nephro cx, ivfs  Thanks,  Winsome Madera RN/CDI   Options provided:  -- harpal confirmed after study  -- harpal treated and resolved  -- harpal  ruled out after study  -- Other - I will add my own diagnosis  -- Disagree - Not applicable / Not valid  -- Disagree - Clinically unable to determine / Unknown  -- Refer to Clinical Documentation Reviewer    PROVIDER RESPONSE TEXT:    harpal treated and resolved.     Query created by: Saumya Tan on 2022 2:15 PM      Electronically signed by:  Dinesh Means MD 2022 6:29 PM

## 2023-01-05 PROBLEM — Z01.818 PREOPERATIVE CLEARANCE: Status: RESOLVED | Noted: 2022-12-06 | Resolved: 2023-01-05

## 2023-01-05 RX ORDER — METOPROLOL TARTRATE 25 MG/1
TABLET, FILM COATED ORAL
Qty: 30 TABLET | Refills: 1 | OUTPATIENT
Start: 2023-01-05

## 2023-01-05 RX ORDER — DOXAZOSIN 2 MG/1
2 TABLET ORAL
Qty: 30 TABLET | Refills: 1 | OUTPATIENT
Start: 2023-01-05 | End: 2023-03-06

## 2023-08-06 ENCOUNTER — APPOINTMENT (OUTPATIENT)
Facility: HOSPITAL | Age: 38
End: 2023-08-06
Payer: COMMERCIAL

## 2023-08-06 ENCOUNTER — HOSPITAL ENCOUNTER (EMERGENCY)
Facility: HOSPITAL | Age: 38
Discharge: HOME | End: 2023-08-07
Attending: EMERGENCY MEDICINE
Payer: COMMERCIAL

## 2023-08-06 DIAGNOSIS — T40.1X1A ACCIDENTAL OVERDOSE OF HEROIN, INITIAL ENCOUNTER (HCC): Primary | ICD-10-CM

## 2023-08-06 LAB
GLUCOSE BLD STRIP.AUTO-MCNC: 156 MG/DL (ref 65–117)
SERVICE CMNT-IMP: ABNORMAL

## 2023-08-06 PROCEDURE — 99284 EMERGENCY DEPT VISIT MOD MDM: CPT

## 2023-08-06 PROCEDURE — 82962 GLUCOSE BLOOD TEST: CPT

## 2023-08-06 PROCEDURE — 6370000000 HC RX 637 (ALT 250 FOR IP): Performed by: EMERGENCY MEDICINE

## 2023-08-06 PROCEDURE — 71045 X-RAY EXAM CHEST 1 VIEW: CPT

## 2023-08-06 PROCEDURE — 93005 ELECTROCARDIOGRAM TRACING: CPT | Performed by: EMERGENCY MEDICINE

## 2023-08-06 RX ORDER — ACETAMINOPHEN 500 MG
1000 TABLET ORAL ONCE
Status: COMPLETED | OUTPATIENT
Start: 2023-08-06 | End: 2023-08-06

## 2023-08-06 RX ORDER — ONDANSETRON 4 MG/1
4 TABLET, ORALLY DISINTEGRATING ORAL ONCE AS NEEDED
Status: COMPLETED | OUTPATIENT
Start: 2023-08-06 | End: 2023-08-06

## 2023-08-06 RX ORDER — IBUPROFEN 600 MG/1
600 TABLET ORAL
Status: COMPLETED | OUTPATIENT
Start: 2023-08-06 | End: 2023-08-06

## 2023-08-06 RX ADMIN — ONDANSETRON 4 MG: 4 TABLET, ORALLY DISINTEGRATING ORAL at 19:37

## 2023-08-06 RX ADMIN — ACETAMINOPHEN 1000 MG: 500 TABLET ORAL at 19:36

## 2023-08-06 RX ADMIN — IBUPROFEN 600 MG: 600 TABLET, FILM COATED ORAL at 19:36

## 2023-08-06 ASSESSMENT — PAIN SCALES - GENERAL
PAINLEVEL_OUTOF10: 4
PAINLEVEL_OUTOF10: 0

## 2023-08-06 ASSESSMENT — PAIN DESCRIPTION - PAIN TYPE: TYPE: ACUTE PAIN

## 2023-08-06 ASSESSMENT — PAIN DESCRIPTION - ORIENTATION: ORIENTATION: LEFT

## 2023-08-06 ASSESSMENT — PAIN - FUNCTIONAL ASSESSMENT: PAIN_FUNCTIONAL_ASSESSMENT: 0-10

## 2023-08-06 ASSESSMENT — LIFESTYLE VARIABLES
HOW MANY STANDARD DRINKS CONTAINING ALCOHOL DO YOU HAVE ON A TYPICAL DAY: 1 OR 2
HOW OFTEN DO YOU HAVE A DRINK CONTAINING ALCOHOL: 2-4 TIMES A MONTH

## 2023-08-06 ASSESSMENT — PAIN DESCRIPTION - LOCATION: LOCATION: CHEST

## 2023-08-06 NOTE — ED PROVIDER NOTES
[facility] EMERGENCY DEPT  EMERGENCY DEPARTMENT ENCOUNTER       Pt Name: Namrata Love MRN: [id number]  Birthdate 1985  Date of evaluation: 8/6/2023  Provider: Uma Ferrer MD   PCP: No primary care provider on file. Note Started: 3:50 AM 8/6/23     CHIEF COMPLAINT       Chief Complaint   Patient presents with    Drug Overdose        HISTORY OF PRESENT ILLNESS: 1 or more elements      History From: Patient, EMS, History limited by: No limitations     Namrata Love is a 34 y.o. male who presents with chief complaint of opiate overdose. Patient admits to using both heroin/fentanyl and crack cocaine earlier this evening. He was found unresponsive and chest compressions were started by bystanders, EMS arrived and administered 4 mg intranasal Narcan with good return to baseline mental status. Patient reports feeling tired but has no complaints. Endorses mild chest discomfort at site of chest compressions. He states that he had previously been clean for the past 6 months but ever since becoming homeless recently he started using heroin again. Nursing Notes were all reviewed and agreed with or any disagreements were addressed in the HPI. REVIEW OF SYSTEMS        Positives and Pertinent negatives as per HPI. PAST HISTORY     Past Medical History:  Past Medical History:   Diagnosis Date    Hypertension     Ill-defined condition     Hepatitis C- no treatment    Rhabdomyolysis        Past Surgical History:  Past Surgical History:   Procedure Laterality Date    NEUROLOGICAL SURGERY      injection for a pinched nerve    OTHER SURGICAL HISTORY  2021    I and D of a wound       Family History:  No family history on file.     Social History:  Social History     Tobacco Use    Smoking status: Every Day     Packs/day: 1.00     Types: Cigarettes    Smokeless tobacco: Former   Substance Use Topics    Alcohol use: Yes    Drug use: Yes     Types: Marijuana Callie Larve), Heroin       Allergies:  No Known

## 2023-08-06 NOTE — ED TRIAGE NOTES
Patient brought in by EMS, patient found unresponsive, CPR in progress upon EMS arrival, patient given Narcan, patient becomes alert and oriented but drowsy, upon arrival to ED patient is alert and oriented x 3, complains of chest pain where chest compressions were given.  Patient states he was using heroine but unsure of the amount

## 2023-08-07 VITALS
HEIGHT: 67 IN | OXYGEN SATURATION: 92 % | WEIGHT: 155 LBS | HEART RATE: 91 BPM | RESPIRATION RATE: 15 BRPM | SYSTOLIC BLOOD PRESSURE: 126 MMHG | BODY MASS INDEX: 24.33 KG/M2 | TEMPERATURE: 99.5 F | DIASTOLIC BLOOD PRESSURE: 63 MMHG

## 2023-08-07 LAB
EKG ATRIAL RATE: 93 BPM
EKG DIAGNOSIS: NORMAL
EKG P AXIS: 4 DEGREES
EKG P-R INTERVAL: 102 MS
EKG Q-T INTERVAL: 418 MS
EKG QRS DURATION: 94 MS
EKG QTC CALCULATION (BAZETT): 519 MS
EKG R AXIS: 38 DEGREES
EKG T AXIS: 42 DEGREES
EKG VENTRICULAR RATE: 93 BPM

## 2023-08-07 NOTE — DISCHARGE INSTRUCTIONS
Substance Abuse and Addiction Resources    [facility]  [phone]  Closed meeting- family members that have been affected bysomeone alcohol abuse  Open meeting everyone can attend. Alcoholic's Anonymous [phone]  Non professional (alcoholics in recovery helping others)  Hold Meetings (talk about sobriety, have desire)  No charge    [facility]  Marcos Shaikh is the Treatment Consultant in the [facility]  Free one-on-one phone consultation and insurance verification  12 step based meetings and philosophy  Family programs and sessions    [facility] [phone]  Free individual assessment  Intensive outpatient outpatient program  Ambulatory withdrawal management/detoxification  Insurance required    [employer]  [phone] [facility] location), [phone] ([address] [place] [address] location)  An Office Based Opioid Treatment Program  Individual and Group therapy, Peer Recovery Services and Care Coordination  Accepting Medicare, Medicaid and Commercial/private insurance  $200 a month self-pay program (does not include medicine or outside labs)  [address] [facility], [address] [place] [address], [address] [place] [address] Road [facility] (Monday - Friday, 9a-5p. Standing ED [facility] ED referral appointment 10:00-11:00 Monday)  [address], [address] [facility], [address] [facility], [address] [place] [address] (Tuesday - Friday, 9a-5p Standing [facility] ED referral appointment 10:00-11:00 Tuesday - Friday)    [facility] [phone] or [phone]  Good for patients with no insurance, Medicaid, Medicare  Sliding fee scale available for self pay  Patients go Monday-Friday from 8-4:30 to central intake for a shelter and then to [facility] for services  Offers a variety of services I.e.  Laundry, shower, eye clinic, medical clinic, dental, substance abuse services, case management, etc.    [facility] [phone]  Make appointment with counselor  $97 fee for initial hour intake    [facility] of [address]